# Patient Record
Sex: FEMALE | Race: WHITE | Employment: UNEMPLOYED | URBAN - METROPOLITAN AREA
[De-identification: names, ages, dates, MRNs, and addresses within clinical notes are randomized per-mention and may not be internally consistent; named-entity substitution may affect disease eponyms.]

---

## 2024-03-11 ENCOUNTER — OFFICE VISIT (OUTPATIENT)
Dept: OBGYN CLINIC | Age: 34
End: 2024-03-11
Payer: COMMERCIAL

## 2024-03-11 ENCOUNTER — PROCEDURE VISIT (OUTPATIENT)
Dept: OBGYN CLINIC | Age: 34
End: 2024-03-11
Payer: COMMERCIAL

## 2024-03-11 VITALS — DIASTOLIC BLOOD PRESSURE: 80 MMHG | SYSTOLIC BLOOD PRESSURE: 106 MMHG | WEIGHT: 133 LBS

## 2024-03-11 DIAGNOSIS — Z32.01 POSITIVE PREGNANCY TEST: ICD-10-CM

## 2024-03-11 DIAGNOSIS — Z34.90 NORMAL REPEAT PREGNANCY, ANTEPARTUM: ICD-10-CM

## 2024-03-11 DIAGNOSIS — N92.6 MISSED MENSES: Primary | ICD-10-CM

## 2024-03-11 PROCEDURE — 76801 OB US < 14 WKS SINGLE FETUS: CPT | Performed by: OBSTETRICS & GYNECOLOGY

## 2024-03-11 PROCEDURE — 81025 URINE PREGNANCY TEST: CPT | Performed by: ADVANCED PRACTICE MIDWIFE

## 2024-03-11 PROCEDURE — 99203 OFFICE O/P NEW LOW 30 MIN: CPT | Performed by: ADVANCED PRACTICE MIDWIFE

## 2024-03-11 RX ORDER — PNV NO.95/FERROUS FUM/FOLIC AC 28MG-0.8MG
1 TABLET ORAL DAILY
COMMUNITY

## 2024-03-11 NOTE — PROGRESS NOTES
Pt is here today for missed menses, positive pregnancy. She is breastfeeding currently  POCT -     
vs NIPs info provided for screening. Reviewed 1st trimester screening would be performed at [facility] around 10-12 weeks and referral will need placed prior to scheduling. Discussed with patient that if they proceed with the NIPs testing then they will be opting out of 1st trimester screening which can provide information in regards to placental health, congenital heart defects, metabolic abnormalities, and anatomic abnormalities. Given handout and instructed to fill out back side and sign with her decision to bring to OB History Visit.   Discussed Carrier Screening, given handout and instructed to fill out back side and sign with her decision by OB History Visit.   Prenatal labs to be ordered and drawn at OB History visit.   1 hour glucola needed: No  She was instructed to call with any concerns or worsening of any symptoms  Given New OB packet with information about nausea/vomiting in pregnancy, genetic information, carrier screening information.   She will return for New OB intake visit with Claudette MCDONOUGH.       Problem List updated after visit.    Return in about 2 weeks (around 3/25/2024) for OB Hx with Claudette.    The patient, Morelia Flannery, was seen with a total time spent of 35 minutes for the visit on this date of service [facility]  The time component involved both face-to-face (counseling and education) and non face-to-face time (care coordination), spent in determining the total time component.      Electronically signed by: FLACA Mann CNM

## 2024-03-11 NOTE — PROGRESS NOTES
LMP: 1/10/24    GA by LMP: 8w5d  JACKSON by LMP: 10/16/24    GA by U/S: 6w1d IUP  JACKSON by U/S: 11/3/24  HR: 109 bpm, most likely slow due to early GA    RT. OVARY: Not visualized on today's ultrasound  LT. OVARY: Not visualized on today's ultrasound     No free fluid    Patient returning in 2 weeks for repeat ultrasound and IPV

## 2024-03-25 ENCOUNTER — HOSPITAL ENCOUNTER (OUTPATIENT)
Age: 34
Discharge: HOME | End: 2024-03-25
Payer: COMMERCIAL

## 2024-03-25 LAB
25(OH)D3 SERPL-MCNC: 26.7 NG/ML (ref 30–100)
FOLATE SERPL-MCNC: >20 NG/ML (ref 4.8–24.2)
MAGNESIUM SERPL-MCNC: 2.2 MG/DL (ref 1.6–2.6)
T3FREE SERPL-MCNC: 2.9 PG/ML (ref 2–4.4)
T4 FREE SERPL-MCNC: 1.1 NG/DL (ref 0.92–1.68)
TSH SERPL DL<=0.05 MIU/L-ACNC: 0.34 UIU/ML (ref 0.27–4.2)
VIT B12 SERPL-MCNC: 881 PG/ML (ref 232–1245)

## 2024-03-25 PROCEDURE — 84443 ASSAY THYROID STIM HORMONE: CPT

## 2024-03-25 PROCEDURE — 84439 ASSAY OF FREE THYROXINE: CPT

## 2024-03-25 PROCEDURE — 82746 ASSAY OF FOLIC ACID SERUM: CPT

## 2024-03-25 PROCEDURE — 84481 FREE ASSAY (FT-3): CPT

## 2024-03-25 PROCEDURE — 82306 VITAMIN D 25 HYDROXY: CPT

## 2024-03-25 PROCEDURE — 36415 COLL VENOUS BLD VENIPUNCTURE: CPT

## 2024-03-25 PROCEDURE — 83735 ASSAY OF MAGNESIUM: CPT

## 2024-03-25 PROCEDURE — 82607 VITAMIN B-12: CPT

## 2024-03-25 NOTE — PROGRESS NOTES
[facility] only delivers at [facility] and is agreeable to delivery at [facility].  She denies tobacco use. The patient was counseled on the risks of tobacco abuse, both maternal and fetal. She was instructed to stop smoking if currently using tobacco. Morbidity, mortality, and cessation programs were reviewed. The risks include but are not limited to increased risks of  labor,  delivery, premature rupture of membranes, intrauterine growth restriction, intrauterine fetal demise and abruptio placenta. Secondary smoke risks were also reviewed. Increases in cancer, respiratory problems, and sudden infant death syndrome were reviewed as well.  She was made aware of the Midwife Philosophy against Elective Induction.     Morelia was seen today for initial prenatal visit.    Diagnoses and all orders for this visit:    Normal repeat pregnancy, antepartum  -     C.trachomatis N.gonorrhoeae DNA, Urine; Future  -     Culture, Urine; Future  -     Hemoglobinopathy Evaluation; Future  -     Hepatitis C Antibody; Future  -     HIV Screen; Future  -     Prenatal Profile I; Future  -     Prenatal type and screen; Future  -     Urine Drug Screen; Future  -     Varicella Zoster Antibody, IgG; Future  -     Vitamin D 25 Hydroxy; Future  -     Ambulatory referral to Maternal Fetal         Completed chart forwarded to Radha ELLER after completing visit.     Charted by Claudette Owens LPN

## 2024-03-27 ENCOUNTER — INITIAL PRENATAL (OUTPATIENT)
Dept: OBGYN CLINIC | Age: 34
End: 2024-03-27
Payer: COMMERCIAL

## 2024-03-27 ENCOUNTER — PROCEDURE VISIT (OUTPATIENT)
Dept: OBGYN CLINIC | Age: 34
End: 2024-03-27
Payer: COMMERCIAL

## 2024-03-27 ENCOUNTER — HOSPITAL ENCOUNTER (OUTPATIENT)
Age: 34
Setting detail: SPECIMEN
Discharge: HOME | End: 2024-03-27

## 2024-03-27 VITALS
WEIGHT: 134.1 LBS | BODY MASS INDEX: 22.9 KG/M2 | SYSTOLIC BLOOD PRESSURE: 116 MMHG | DIASTOLIC BLOOD PRESSURE: 70 MMHG | HEIGHT: 64 IN | HEART RATE: 93 BPM

## 2024-03-27 DIAGNOSIS — Z34.90 NORMAL REPEAT PREGNANCY, ANTEPARTUM: Primary | ICD-10-CM

## 2024-03-27 DIAGNOSIS — Z34.90 NORMAL REPEAT PREGNANCY, ANTEPARTUM: ICD-10-CM

## 2024-03-27 DIAGNOSIS — Z36.2 ENCOUNTER FOR REPEAT ULTRASOUND FOR FETAL HEART RATE: ICD-10-CM

## 2024-03-27 PROCEDURE — 0500F INITIAL PRENATAL CARE VISIT: CPT

## 2024-03-27 PROCEDURE — 36415 COLL VENOUS BLD VENIPUNCTURE: CPT | Performed by: ADVANCED PRACTICE MIDWIFE

## 2024-03-27 PROCEDURE — 76801 OB US < 14 WKS SINGLE FETUS: CPT | Performed by: OBSTETRICS & GYNECOLOGY

## 2024-03-27 SDOH — ECONOMIC STABILITY: FOOD INSECURITY: WITHIN THE PAST 12 MONTHS, YOU WORRIED THAT YOUR FOOD WOULD RUN OUT BEFORE YOU GOT MONEY TO BUY MORE.: NEVER TRUE

## 2024-03-27 SDOH — ECONOMIC STABILITY: HOUSING INSECURITY
IN THE LAST 12 MONTHS, WAS THERE A TIME WHEN YOU DID NOT HAVE A STEADY PLACE TO SLEEP OR SLEPT IN A SHELTER (INCLUDING NOW)?: NO

## 2024-03-27 SDOH — ECONOMIC STABILITY: INCOME INSECURITY: IN THE LAST 12 MONTHS, WAS THERE A TIME WHEN YOU WERE NOT ABLE TO PAY THE MORTGAGE OR RENT ON TIME?: NO

## 2024-03-27 SDOH — ECONOMIC STABILITY: TRANSPORTATION INSECURITY
IN THE PAST 12 MONTHS, HAS LACK OF TRANSPORTATION KEPT YOU FROM MEETINGS, WORK, OR FROM GETTING THINGS NEEDED FOR DAILY LIVING?: NO

## 2024-03-27 SDOH — ECONOMIC STABILITY: HOUSING INSECURITY: IN THE LAST 12 MONTHS, HOW MANY PLACES HAVE YOU LIVED?: 2

## 2024-03-27 SDOH — ECONOMIC STABILITY: FOOD INSECURITY: WITHIN THE PAST 12 MONTHS, THE FOOD YOU BOUGHT JUST DIDN'T LAST AND YOU DIDN'T HAVE MONEY TO GET MORE.: NEVER TRUE

## 2024-03-27 SDOH — ECONOMIC STABILITY: TRANSPORTATION INSECURITY
IN THE PAST 12 MONTHS, HAS THE LACK OF TRANSPORTATION KEPT YOU FROM MEDICAL APPOINTMENTS OR FROM GETTING MEDICATIONS?: NO

## 2024-03-27 ASSESSMENT — ANXIETY QUESTIONNAIRES
7. FEELING AFRAID AS IF SOMETHING AWFUL MIGHT HAPPEN: SEVERAL DAYS
1. FEELING NERVOUS, ANXIOUS, OR ON EDGE: SEVERAL DAYS
3. WORRYING TOO MUCH ABOUT DIFFERENT THINGS: SEVERAL DAYS
6. BECOMING EASILY ANNOYED OR IRRITABLE: SEVERAL DAYS
5. BEING SO RESTLESS THAT IT IS HARD TO SIT STILL: NOT AT ALL
GAD7 TOTAL SCORE: 4
2. NOT BEING ABLE TO STOP OR CONTROL WORRYING: NOT AT ALL
IF YOU CHECKED OFF ANY PROBLEMS ON THIS QUESTIONNAIRE, HOW DIFFICULT HAVE THESE PROBLEMS MADE IT FOR YOU TO DO YOUR WORK, TAKE CARE OF THINGS AT HOME, OR GET ALONG WITH OTHER PEOPLE: NOT DIFFICULT AT ALL
4. TROUBLE RELAXING: NOT AT ALL

## 2024-03-27 ASSESSMENT — SOCIAL DETERMINANTS OF HEALTH (SDOH)
HOW HARD IS IT FOR YOU TO PAY FOR THE VERY BASICS LIKE FOOD, HOUSING, MEDICAL CARE, AND HEATING?: NOT HARD AT ALL
WITHIN THE LAST YEAR, HAVE TO BEEN RAPED OR FORCED TO HAVE ANY KIND OF SEXUAL ACTIVITY BY YOUR PARTNER OR EX-PARTNER?: NO
WITHIN THE LAST YEAR, HAVE YOU BEEN HUMILIATED OR EMOTIONALLY ABUSED IN OTHER WAYS BY YOUR PARTNER OR EX-PARTNER?: NO
WITHIN THE LAST YEAR, HAVE YOU BEEN AFRAID OF YOUR PARTNER OR EX-PARTNER?: NO
WITHIN THE LAST YEAR, HAVE YOU BEEN KICKED, HIT, SLAPPED, OR OTHERWISE PHYSICALLY HURT BY YOUR PARTNER OR EX-PARTNER?: NO

## 2024-03-27 NOTE — PROGRESS NOTES
LMP: 1/10/24     GA by LMP: 11w  JACKSON by LMP: 10/16/24     GA by U/S: 8w5d IUP  JACKSON by U/S: 11/1/24  HR: 167 bpm    RT. OVARY: 2.21 x 1.53 x 1.83 cm  Unremarkable  LT. OVARY: Not visualized    No free fluid

## 2024-03-28 DIAGNOSIS — Z34.90 NORMAL REPEAT PREGNANCY, ANTEPARTUM: Primary | ICD-10-CM

## 2024-03-28 PROBLEM — R79.89 LOW VITAMIN D LEVEL: Status: ACTIVE | Noted: 2024-03-28

## 2024-03-28 LAB
25(OH)D3 SERPL-MCNC: 27 NG/ML (ref 30–100)
ABO + RH BLD: NORMAL
AMPHET UR QL SCN: NEGATIVE
BARBITURATES UR QL SCN: NEGATIVE
BASOPHILS # BLD: 0.04 K/UL (ref 0–0.2)
BASOPHILS NFR BLD: 0 % (ref 0–2)
BENZODIAZ UR QL: NEGATIVE
BLOOD GROUP ANTIBODIES SERPL: NEGATIVE
CHLAMYDIA DNA UR QL NAA+PROBE: NEGATIVE
COCAINE UR QL SCN: NEGATIVE
EOSINOPHILS RELATIVE PERCENT: 1 % (ref 1–4)
ERYTHROCYTE [DISTWIDTH] IN BLOOD BY AUTOMATED COUNT: 12.8 % (ref 11.8–14.4)
FENTANYL UR QL: NEGATIVE
HBV SURFACE AG SERPL QL IA: NONREACTIVE
HCT VFR BLD AUTO: 40.3 % (ref 36.3–47.1)
HGB BLD-MCNC: 13.8 G/DL (ref 11.9–15.1)
HIV 1+2 AB+HIV1 P24 AG SERPL QL IA: NONREACTIVE
IMM GRANULOCYTES # BLD AUTO: 0.04 K/UL (ref 0–0.3)
IMM GRANULOCYTES NFR BLD: 0 %
LYMPHOCYTES RELATIVE PERCENT: 19 % (ref 24–43)
MCH RBC QN AUTO: 31 PG (ref 25.2–33.5)
MCHC RBC AUTO-ENTMCNC: 34.2 G/DL (ref 28.4–34.8)
MCV RBC AUTO: 90.6 FL (ref 82.6–102.9)
MICROORGANISM SPEC CULT: NORMAL
MONOCYTES NFR BLD: 0.67 K/UL (ref 0.1–1.2)
N GONORRHOEA DNA UR QL NAA+PROBE: NEGATIVE
NEUTROPHILS NFR BLD: 74 % (ref 36–65)
NEUTS SEG NFR BLD: 8.59 K/UL (ref 1.5–8.1)
NRBC BLD-RTO: 0 PER 100 WBC
OPIATES UR QL SCN: POSITIVE
OXYCODONE UR QL SCN: NEGATIVE
PCP UR QL SCN: NEGATIVE
PLATELET # BLD AUTO: 236 K/UL (ref 138–453)
PMV BLD AUTO: 10 FL (ref 8.1–13.5)
RBC # BLD AUTO: 4.45 M/UL (ref 3.95–5.11)
RUBV IGG SERPL QL IA: 212 IU/ML
T PALLIDUM AB SER QL IA: NONREACTIVE
TEST INFORMATION: ABNORMAL
WBC OTHER # BLD: 11.6 K/UL (ref 3.5–11.3)

## 2024-03-29 PROBLEM — Z82.49 FAMILY HISTORY OF HIGH BLOOD PRESSURE: Status: ACTIVE | Noted: 2024-03-29

## 2024-03-29 PROBLEM — F41.9 ANXIETY: Status: ACTIVE | Noted: 2024-03-29

## 2024-03-29 LAB
HGB ELECTROPHORESIS INTERP: NORMAL
VZV IGG SER QL IA: 1.6

## 2024-04-02 DIAGNOSIS — Z34.90 NORMAL REPEAT PREGNANCY, ANTEPARTUM: ICD-10-CM

## 2024-04-05 LAB
CODEINE, URINE: 29 NG/ML
HYDROCODONE, URINE CONFIRMATION: <20 NG/ML
HYDROMORPHONE, URINE CONFIRMATION: <20 NG/ML
MORPHINE, URINE CONFIRMATION: <20 NG/ML
NORHYDROCODONE, URINE: <20 NG/ML
NOROXYCODONE, URINE: <20 NG/ML
NOROXYMORPHONE, URINE: <20 NG/ML
OPIATE, 6-AM URINE: <10 NG/ML
OXYCODONE, URINE CONFIRMATION: <20 NG/ML
OXYMORPHONE, URINE CONFIRMATION: <20 NG/ML

## 2024-04-08 PROBLEM — R82.5 POSITIVE URINE DRUG SCREEN: Status: ACTIVE | Noted: 2024-04-08

## 2024-04-22 ENCOUNTER — ROUTINE PRENATAL (OUTPATIENT)
Dept: OBGYN CLINIC | Age: 34
End: 2024-04-22

## 2024-04-22 ENCOUNTER — HOSPITAL ENCOUNTER (OUTPATIENT)
Age: 34
Setting detail: SPECIMEN
Discharge: HOME | End: 2024-04-22

## 2024-04-22 VITALS
WEIGHT: 136.9 LBS | BODY MASS INDEX: 23.5 KG/M2 | DIASTOLIC BLOOD PRESSURE: 68 MMHG | HEART RATE: 91 BPM | SYSTOLIC BLOOD PRESSURE: 112 MMHG

## 2024-04-22 DIAGNOSIS — Z34.90 NORMAL REPEAT PREGNANCY, ANTEPARTUM: ICD-10-CM

## 2024-04-22 DIAGNOSIS — Z34.90 NORMAL REPEAT PREGNANCY, ANTEPARTUM: Primary | ICD-10-CM

## 2024-04-22 DIAGNOSIS — F41.9 ANXIETY: ICD-10-CM

## 2024-04-22 DIAGNOSIS — R82.5 POSITIVE URINE DRUG SCREEN: ICD-10-CM

## 2024-04-22 DIAGNOSIS — Z86.39 HISTORY OF HYPOGLYCEMIA: ICD-10-CM

## 2024-04-22 DIAGNOSIS — Z82.49 FAMILY HISTORY OF HIGH BLOOD PRESSURE: ICD-10-CM

## 2024-04-22 DIAGNOSIS — Z3A.12 12 WEEKS GESTATION OF PREGNANCY: ICD-10-CM

## 2024-04-22 DIAGNOSIS — R79.89 LOW VITAMIN D LEVEL: ICD-10-CM

## 2024-04-22 PROCEDURE — 0502F SUBSEQUENT PRENATAL CARE: CPT | Performed by: ADVANCED PRACTICE MIDWIFE

## 2024-04-22 RX ORDER — BLOOD-GLUCOSE METER
1 KIT MISCELLANEOUS 4 TIMES DAILY
Qty: 1 KIT | Refills: 0 | Status: SHIPPED | OUTPATIENT
Start: 2024-04-22

## 2024-04-22 RX ORDER — SERTRALINE HYDROCHLORIDE 100 MG/1
100 TABLET, FILM COATED ORAL DAILY
COMMUNITY
Start: 2024-04-09

## 2024-04-22 RX ORDER — LANCETS 30 GAUGE
1 EACH MISCELLANEOUS 4 TIMES DAILY
Qty: 100 EACH | Refills: 3 | Status: SHIPPED | OUTPATIENT
Start: 2024-04-22

## 2024-04-22 RX ORDER — GLUCOSAMINE HCL/CHONDROITIN SU 500-400 MG
1 CAPSULE ORAL 4 TIMES DAILY
Qty: 100 STRIP | Refills: 3 | Status: SHIPPED | OUTPATIENT
Start: 2024-04-22

## 2024-04-22 RX ORDER — PNV NO.95/FERROUS FUM/FOLIC AC 28MG-0.8MG
1 TABLET ORAL DAILY
Qty: 30 TABLET | Refills: 11 | Status: SHIPPED | OUTPATIENT
Start: 2024-04-22

## 2024-04-22 RX ORDER — BLOOD PRESSURE TEST KIT
1 KIT MISCELLANEOUS 4 TIMES DAILY
Qty: 100 EACH | Refills: 3 | Status: SHIPPED | OUTPATIENT
Start: 2024-04-22

## 2024-04-22 ASSESSMENT — PATIENT HEALTH QUESTIONNAIRE - PHQ9
SUM OF ALL RESPONSES TO PHQ QUESTIONS 1-9: 0
2. FEELING DOWN, DEPRESSED OR HOPELESS: NOT AT ALL
SUM OF ALL RESPONSES TO PHQ9 QUESTIONS 1 & 2: 0
SUM OF ALL RESPONSES TO PHQ QUESTIONS 1-9: 0
1. LITTLE INTEREST OR PLEASURE IN DOING THINGS: NOT AT ALL

## 2024-04-22 NOTE — PROGRESS NOTES
Pt is here today at her 12.1 pnv   Pt states fetal movement is n/a  Pt has no concerns     
exam/evaluation  [x] Ordering medications, tests, procedures  [x] Placing referrals or communicating with other HCP  [x] Documenting and updating Problem List as necessary  [x] Importance of compliance with treatment plan discussed  [x] Counseling patient/support person  [] Coordinating care    Electronically signed by: FLACA Mann CNM

## 2024-04-23 LAB
AMPHET UR QL SCN: NEGATIVE
BARBITURATES UR QL SCN: NEGATIVE
BENZODIAZ UR QL: NEGATIVE
CANNABINOIDS UR QL SCN: NEGATIVE
COCAINE UR QL SCN: NEGATIVE
FENTANYL UR QL: NEGATIVE
METHADONE UR QL: NEGATIVE
OPIATES UR QL SCN: NEGATIVE
OXYCODONE UR QL SCN: NEGATIVE
PCP UR QL SCN: NEGATIVE
TEST INFORMATION: NORMAL

## 2024-05-21 ENCOUNTER — ROUTINE PRENATAL (OUTPATIENT)
Dept: OBGYN CLINIC | Age: 34
End: 2024-05-21

## 2024-05-21 VITALS
HEART RATE: 92 BPM | DIASTOLIC BLOOD PRESSURE: 70 MMHG | WEIGHT: 137.2 LBS | SYSTOLIC BLOOD PRESSURE: 112 MMHG | BODY MASS INDEX: 23.55 KG/M2

## 2024-05-21 DIAGNOSIS — Z34.90 NORMAL REPEAT PREGNANCY, ANTEPARTUM: Primary | ICD-10-CM

## 2024-05-21 DIAGNOSIS — R82.5 POSITIVE URINE DRUG SCREEN: ICD-10-CM

## 2024-05-21 PROCEDURE — 0502F SUBSEQUENT PRENATAL CARE: CPT

## 2024-05-21 NOTE — PROGRESS NOTES
SUBJECTIVE:  Morelia is here for her return OB visit.  She reports feeling fetal movement.  She denies vaginal bleeding.  She denies vaginal discharge.  She denies leaking of fluid.  She denies uterine cramping.  She denies nausea and/or vomiting.  Morelia reports she is doing well, no concerns or complaints today    OBJECTIVE:  Blood pressure 112/70, pulse 92, weight 62.2 kg (137 lb 3.2 oz), last menstrual period 01/10/2024, currently breastfeeding.    Pregravid BMI: 24.02   TWG: -1.27 kg (-2 lb 12.8 oz)      O positive    ASSESSMENT/PLAN:  IUP at 16w2d  Morelia will watch for fetal movement.  NT screening was previously declined  NIPT was previously declined  Carrier screening was previously declined  [x] EDC was established.  [x] Labs were reviewed.  [] Single sMAFP was not ordered for the 16-20 wk gestational window.  [] Weight gain guidelines were not reviewed.   Normal: BMI < 25: Gain 25-35 lb  Overweight: BMI 25-30: Gain 15-25 lb  Obese: BMI > 30: Gain 11-20 lb  [x] Reviewed warning signs: cramping, acute abdominal pain, dysuria, fever/chills, abnormal vaginal discharge or leaking fluid, or vaginal bleeding.  [x] MFM referral in place for anatomy scan at 18-22 weeks.   [x] I have reviewed RN Initial OB Visit.   S =D  Morelia was seen today for routine prenatal visit.    Diagnoses and all orders for this visit:    Normal repeat pregnancy, antepartum  Anatomy scan 7/8/24    Positive urine drug screen (FALSE POSITIVE)  Repeat monthly    Return in about 4 weeks (around 6/18/2024) for OB visit with midwives.    The patient, Morelia Flannery, was seen with a total time spent of 30 minutes for the visit on this date [facility]  The time component involved both face-to-face (counseling and education) and non face-to-face time (care coordination), spent in determining the total time component.      On this date May 21, 2024,  I have spent greater than 50% of this visit:  [x] Reviewing previous

## 2024-06-28 ENCOUNTER — ROUTINE PRENATAL (OUTPATIENT)
Dept: OBGYN CLINIC | Age: 34
End: 2024-06-28

## 2024-06-28 VITALS
DIASTOLIC BLOOD PRESSURE: 70 MMHG | BODY MASS INDEX: 25.4 KG/M2 | SYSTOLIC BLOOD PRESSURE: 108 MMHG | HEART RATE: 66 BPM | WEIGHT: 148 LBS

## 2024-06-28 DIAGNOSIS — Z34.90 NORMAL REPEAT PREGNANCY, ANTEPARTUM: ICD-10-CM

## 2024-06-28 DIAGNOSIS — Z3A.21 21 WEEKS GESTATION OF PREGNANCY: Primary | ICD-10-CM

## 2024-06-28 DIAGNOSIS — R79.89 LOW VITAMIN D LEVEL: ICD-10-CM

## 2024-06-28 DIAGNOSIS — F41.9 ANXIETY: ICD-10-CM

## 2024-06-28 PROCEDURE — 0502F SUBSEQUENT PRENATAL CARE: CPT

## 2024-06-28 NOTE — PROGRESS NOTES
Pt is here today at her 21.5  Pt states fetal movement is present  Pt has no concerns   EPDS - 5  
pregnancy, antepartum  Anatomy scan is scheduled for 7/8/24  Next appointment to be in 6 weeks    Anxiety  EPDS today is 5  Patient states she is stable on Zoloft    Low vitamin D level  Discussed redrawing at 28 weeks          Return in about 6 weeks (around 8/9/2024) for return ob with glucose testing.     The patient, Morelia Flannery, was seen with a total time spent of 25 minutes for the visit on this date of service [facility] the [facility]  The time component involved both face-to-face (counseling and education) and non face-to-face time (care coordination), spent in determining the total time component.      On this date June 28, 2024, I have spent greater than 50% of this visit:  [x] Reviewing previous notes/pre-charting  [x] Reviewing test results  [x] Performing necessary physical exam/evaluation  [] Ordering medications, tests, procedures  [] Placing referrals or communicating with other HCP  [x] Documenting and updating Problem List as necessary  [x] Importance of compliance with treatment plan discussed  [x] Counseling patient/support person  [] Coordinating care    Electronically signed by: FLACA Bertrand CNM

## 2024-07-08 ENCOUNTER — ROUTINE PRENATAL (OUTPATIENT)
Dept: PERINATAL CARE | Age: 34
End: 2024-07-08
Payer: COMMERCIAL

## 2024-07-08 VITALS
RESPIRATION RATE: 16 BRPM | TEMPERATURE: 97.6 F | HEART RATE: 98 BPM | SYSTOLIC BLOOD PRESSURE: 120 MMHG | BODY MASS INDEX: 25.86 KG/M2 | DIASTOLIC BLOOD PRESSURE: 77 MMHG | WEIGHT: 151.46 LBS | HEIGHT: 64 IN

## 2024-07-08 DIAGNOSIS — O09.891 MEDICATION EXPOSURE DURING FIRST TRIMESTER OF PREGNANCY: Primary | ICD-10-CM

## 2024-07-08 DIAGNOSIS — O43.102 PLACENTAL ABNORMALITY IN SECOND TRIMESTER: ICD-10-CM

## 2024-07-08 DIAGNOSIS — O35.8XX0 SUSPECTED DAMAGE TO FETUS FROM DISEASE IN MOTHER, ANTEPARTUM CONDITION, SINGLE OR UNSPECIFIED FETUS: ICD-10-CM

## 2024-07-08 DIAGNOSIS — Z36.86 ENCOUNTER FOR SCREENING FOR RISK OF PRE-TERM LABOR: ICD-10-CM

## 2024-07-08 DIAGNOSIS — Z3A.23 23 WEEKS GESTATION OF PREGNANCY: ICD-10-CM

## 2024-07-08 PROCEDURE — 99999 PR OFFICE/OUTPT VISIT,PROCEDURE ONLY: CPT | Performed by: OBSTETRICS & GYNECOLOGY

## 2024-07-08 PROCEDURE — 93325 DOPPLER ECHO COLOR FLOW MAPG: CPT | Performed by: OBSTETRICS & GYNECOLOGY

## 2024-07-08 PROCEDURE — 76825 ECHO EXAM OF FETAL HEART: CPT | Performed by: OBSTETRICS & GYNECOLOGY

## 2024-07-08 PROCEDURE — 76827 ECHO EXAM OF FETAL HEART: CPT | Performed by: OBSTETRICS & GYNECOLOGY

## 2024-07-08 PROCEDURE — 76817 TRANSVAGINAL US OBSTETRIC: CPT | Performed by: OBSTETRICS & GYNECOLOGY

## 2024-07-08 PROCEDURE — 76811 OB US DETAILED SNGL FETUS: CPT | Performed by: OBSTETRICS & GYNECOLOGY

## 2024-07-08 NOTE — PROGRESS NOTES
Patient declined non-invasive prenatal testing/NIPT (cell-free DNA screening) that is offered to all pregnant patients irrespective of baseline risk or maternal age (Obstet Gynecol 2020; 136; ACOG Practice Bulletin Number 226, Screening for Fetal Chromosomal Abnormalities).   Patient has declined non-invasive prenatal diagnosis testing (with the [place] Complete test from [employer]) today.     Patient has declined the Five Gene Carrier Screen (with the [place] test from [employer]) today.     MSAFP (maternal serum alpha-feto protein level) lab draw declined by patient.     Patient declines a Maternal-Fetal Medicine complete physician consultation today regarding the fetal and/or maternal medical/obstetrical complications/co-morbidities of pregnancy.      Maternal-Fetal Medicine (MFM) attending physician will defer all management for these medical/obstetrical complications of pregnancy to the primary attending obstetrical physician/provider, as a result.  Therefore, only an ultrasound evaluation was completed today in the MFM office.      Please refer to Maternal-Fetal Medicine OBGYN resident progress note in EPIC.

## 2024-07-08 NOTE — PROGRESS NOTES
Obstetric/Gynecology Maternal Fetal Medicine Resident Note    Patient declines formal consult with MFM attending physician for fetal exposure to Zoloft.     Patient declined NIPT, maternal carrier screen, and AFP testing.    Geno Ivy MD  OBGYN Resident, PGY2  [facility]  7/8/2024, 10:04 AM

## 2024-08-06 NOTE — PROGRESS NOTES
Pt is here today at her 27.5 wk appt   Pt states fetal movement is good  Pt has no concerns              [facility] OBSTETRICS AND GYNECOLOGY  [address]   [address]  [place] [id number]  Dept: [phone] Name: Morelia Flannery  Patient : 1990  MRN #: [id number]  CSN #: [id number]        Visit date:  2024     Morelia Flannery is a 33 y.o. female 27w2d here for 1hr GTT                                          Glucose 50g drink given @ 9:35am              Glucose drink finished @9:37 am                 Lot#[id number]              Exp- 26     1hr Draw @  10:37 am    Pt tolerated procedure well     Jhoana Darnell MA

## 2024-08-07 NOTE — PROGRESS NOTES
SUBJECTIVE:  Morelia is here for her return OB visit.  She is doing well but is reporting concern with anxiety.  Talking with her therapist and considering increasing her dosage. She will let us know  She reports fetal movement.  She denies vaginal bleeding.  She denies vaginal discharge.  She denies leaking of fluid.  She denies uterine contraction activity.  She denies nausea and/or vomiting.  She denies retaining fluid in her extremities.    OBJECTIVE:  Blood pressure 114/64, pulse 96, weight 71.4 kg (157 lb 4.8 oz), last menstrual period 01/10/2024, currently breastfeeding.      Morelia has not received the Tdap vaccine as recommended          6/28/2024    10:35 AM   Postpartum Depression Scale   I have been able to laugh and see the funny side of things As much as I always could   I have looked forward with enjoyment to things As much as I ever did   I have blamed myself unnecessarily when things went wrong Yes, some of the time   I have been anxious or worried for no good reason Yes, sometimes   I have felt scared or panicky for no good reason No, not at all   I haven't been able to cope lately No, most of the time I have coped quite well   I have been so unhappy that I have had difficulty sleeping Not at all   I have felt sad or miserable No, not at all   I have been so unhappy that I have been crying No, never   The thought of harming myself has occurred to me Never   Total 5            4/22/2024     2:16 PM   PHQ Scores   PHQ2 Score 0   PHQ9 Score 0           3/27/2024     3:00 PM   GERI 7 SCORE   GERI-7 Total Score 4          ASSESSMENT/PLAN:  1. 27 weeks gestation of pregnancy  S=D    2. Normal repeat pregnancy, antepartum  The problem list was reviewed and updated with any new issues from today's visit    Morelia will monitor fetal movement daily.    [x] 28 week lab panel was drawn today  [x] .Preliminary discussion on birth plan started    - CBC; Future  - Glucose Tolerance, 1 Hr; Future  - Vitamin D 25

## 2024-08-09 ENCOUNTER — ROUTINE PRENATAL (OUTPATIENT)
Dept: OBGYN CLINIC | Age: 34
End: 2024-08-09

## 2024-08-09 ENCOUNTER — HOSPITAL ENCOUNTER (OUTPATIENT)
Age: 34
Setting detail: SPECIMEN
Discharge: HOME | End: 2024-08-09

## 2024-08-09 VITALS
SYSTOLIC BLOOD PRESSURE: 114 MMHG | WEIGHT: 157.3 LBS | BODY MASS INDEX: 27 KG/M2 | DIASTOLIC BLOOD PRESSURE: 64 MMHG | HEART RATE: 96 BPM

## 2024-08-09 DIAGNOSIS — R79.89 LOW VITAMIN D LEVEL: ICD-10-CM

## 2024-08-09 DIAGNOSIS — Z3A.27 27 WEEKS GESTATION OF PREGNANCY: ICD-10-CM

## 2024-08-09 DIAGNOSIS — F41.9 ANXIETY: ICD-10-CM

## 2024-08-09 DIAGNOSIS — Z34.90 NORMAL REPEAT PREGNANCY, ANTEPARTUM: ICD-10-CM

## 2024-08-09 DIAGNOSIS — Z34.90 NORMAL REPEAT PREGNANCY, ANTEPARTUM: Primary | ICD-10-CM

## 2024-08-09 LAB
25(OH)D3 SERPL-MCNC: 27.3 NG/ML (ref 30–100)
ERYTHROCYTE [DISTWIDTH] IN BLOOD BY AUTOMATED COUNT: 13.3 % (ref 11.8–14.4)
GLUCOSE 1H P 50 G GLC PO SERPL-MCNC: 99 MG/DL (ref 70–135)
GLUCOSE ADMINISTRATION: NORMAL
HCT VFR BLD AUTO: 36.5 % (ref 36.3–47.1)
HGB BLD-MCNC: 11.9 G/DL (ref 11.9–15.1)
MCH RBC QN AUTO: 31.9 PG (ref 25.2–33.5)
MCHC RBC AUTO-ENTMCNC: 32.6 G/DL (ref 28.4–34.8)
MCV RBC AUTO: 97.9 FL (ref 82.6–102.9)
NRBC BLD-RTO: 0 PER 100 WBC
PLATELET # BLD AUTO: 175 K/UL (ref 138–453)
PMV BLD AUTO: 10.5 FL (ref 8.1–13.5)
RBC # BLD AUTO: 3.73 M/UL (ref 3.95–5.11)
WBC OTHER # BLD: 8.3 K/UL (ref 3.5–11.3)

## 2024-08-21 NOTE — PROGRESS NOTES
Pt is here today at her 29.4 pnv   Pt states fetal movement is good  Pt has no concerns     1 hour done   Tdap ? - Patient stated she would like to wait and discuss at NOV. Patient would like a information sheet on Tdap.    Information sheet given to patient.

## 2024-08-22 ENCOUNTER — HOSPITAL ENCOUNTER (OUTPATIENT)
Age: 34
Setting detail: SPECIMEN
Discharge: HOME | End: 2024-08-22

## 2024-08-22 ENCOUNTER — ROUTINE PRENATAL (OUTPATIENT)
Dept: OBGYN CLINIC | Age: 34
End: 2024-08-22

## 2024-08-22 VITALS
HEART RATE: 90 BPM | DIASTOLIC BLOOD PRESSURE: 74 MMHG | WEIGHT: 158.9 LBS | BODY MASS INDEX: 27.28 KG/M2 | SYSTOLIC BLOOD PRESSURE: 116 MMHG

## 2024-08-22 DIAGNOSIS — R82.5 POSITIVE URINE DRUG SCREEN: ICD-10-CM

## 2024-08-22 DIAGNOSIS — Z34.90 NORMAL REPEAT PREGNANCY, ANTEPARTUM: ICD-10-CM

## 2024-08-22 DIAGNOSIS — Z34.90 NORMAL REPEAT PREGNANCY, ANTEPARTUM: Primary | ICD-10-CM

## 2024-08-22 DIAGNOSIS — F41.9 ANXIETY: ICD-10-CM

## 2024-08-22 DIAGNOSIS — Z3A.29 29 WEEKS GESTATION OF PREGNANCY: ICD-10-CM

## 2024-08-22 DIAGNOSIS — Z23 NEED FOR DIPHTHERIA-TETANUS-PERTUSSIS (TDAP) VACCINE: ICD-10-CM

## 2024-08-22 NOTE — PROGRESS NOTES
SUBJECTIVE:  Morelia is here for her return OB visit.  She reports fetal movement.  She denies vaginal bleeding.  She denies vaginal discharge.  She denies leaking of fluid.  She denies uterine contraction activity.  She denies nausea and/or vomiting.  She denies retaining fluid in her extremities.  She denies headache, vision changes, RUQ pain, and epigastric pain.  Morelia reports she is doing okay. Dr. Tabares increased Zoloft to 150 mg.   Interested in nitrous oxide for pain relief in labor.       OBJECTIVE:  Blood pressure 116/74, pulse 90, weight 72.1 kg (158 lb 14.4 oz), last menstrual period 01/10/2024, currently breastfeeding.     Pregravid BMI: 24.02   TW.573 kg (18 lb 14.4 oz)    Morelia has not received the Tdap vaccine as appropriate  Rhogam was not indicated    ASSESSMENT/PLAN:  IUP @ 29w4d  Morelia will monitor fetal movement daily.   [x] 28 week lab results were reviewed. Glucola 99, Hgb 11.9.   [x] Fetal Kick Counts reinforced.   [x] Blair-Joseph contractions vs  labor contractions were reviewed.  [] Signs and symptoms of Pre-Eclampsia were not reviewed and discussed.  [x] Initial discussion regarding birth plans was begun. Given 36w packet and signed nitrous/hydrotherapy.   [x] Given 36 week birth packet for review.    S = D    Normal repeat pregnancy, antepartum  Urine Drug Screen; Future    Positive urine drug screen (FALSE POSITIVE)  See prior notes, no clinical suspicion for opiate use and repeat tesing was negative.   Rpt sent today with consent  Previously recommended rpt every 4 weeks    Anxiety (on SSRI)  Continue Zoloft per psychiatrist order  Feels coping well, continue to monitor    Need Tdap  Undecided, given handout      Return in about 2 weeks (around 2024) for OB visit with Midwives.     The patient, Morelia Flannery, was seen with a total time spent of 35 minutes for the visit on this date of service by the [facility]  The time component involved both face-to-face (counseling and

## 2024-09-04 NOTE — PROGRESS NOTES
Pt is here today at her 31.4 wk appt   Pt states fetal movement is good   Pt has no concerns      Tdap  Urine

## 2024-09-05 ENCOUNTER — ROUTINE PRENATAL (OUTPATIENT)
Dept: OBGYN CLINIC | Age: 34
End: 2024-09-05

## 2024-09-05 VITALS
HEART RATE: 91 BPM | DIASTOLIC BLOOD PRESSURE: 68 MMHG | SYSTOLIC BLOOD PRESSURE: 110 MMHG | BODY MASS INDEX: 27.98 KG/M2 | WEIGHT: 163 LBS

## 2024-09-05 DIAGNOSIS — Z34.90 NORMAL REPEAT PREGNANCY, ANTEPARTUM: ICD-10-CM

## 2024-09-05 DIAGNOSIS — Z3A.31 31 WEEKS GESTATION OF PREGNANCY: Primary | ICD-10-CM

## 2024-09-05 DIAGNOSIS — Z23 NEED FOR DIPHTHERIA-TETANUS-PERTUSSIS (TDAP) VACCINE: ICD-10-CM

## 2024-09-05 NOTE — PROGRESS NOTES
SUBJECTIVE:  Morelia is here for her return OB visit.  She reports fetal movement.  She denies vaginal bleeding.  She denies vaginal discharge.  She denies leaking of fluid.  She denies uterine contraction activity.  She denies nausea and/or vomiting.  She denies retaining fluid in her extremities.  She denies headache, vision changes, RUQ pain, and epigastric pain.    Morelia reports feeling well today. OK with tdap today in office. Is desiring to keep her next appointment spread out, would like to come back in 4 weeks versus 2.     Patient is deciding if she wants to cancel her next MFM scan.     Initiated birth plan discussion today, states she will talk to her  about the rest and let us know.     OBJECTIVE:  Blood pressure 110/68, pulse 91, weight 73.9 kg (163 lb), last menstrual period 01/10/2024, currently breastfeeding.     Pregravid BMI: 24.02   TWG: 10.4 kg (23 lb)    Morelia has received the Tdap vaccine as appropriate, today in office   Rhogam was not indicated    ASSESSMENT/PLAN:  IUP @ 31w4d  Morelia will monitor fetal movement daily.   [x] 28 week lab results were reviewed.   [x] Fetal Kick Count was discussed and explained. She was instructed to lay on her left side 20 minutes after eating and count movements for up to 2 hours with a target value of 10 movements. Instructed to notify office if she does not make the target.  [x] Wheaton-Joseph contractions vs  labor contractions were reviewed.  [x] Signs and symptoms of Pre-Eclampsia were reviewed and discussed.  [x] Initial discussion regarding birth plans was begun.  [] Given 36 week birth packet for review.      Morelia was seen today for routine prenatal visit.    Diagnoses and all orders for this visit:    31 weeks gestation of pregnancy  -     Tdap, BOOSTRIX, (age 10 yrs+), IM    Normal repeat pregnancy, antepartum  S = D    Need for diphtheria-tetanus-pertussis (Tdap) vaccine  Vaccine Information Statement TDAP edition 2/24/15 given to

## 2024-10-07 ENCOUNTER — TELEPHONE (OUTPATIENT)
Dept: OBGYN CLINIC | Age: 34
End: 2024-10-07

## 2024-10-07 NOTE — TELEPHONE ENCOUNTER
Spoke with Morelia, cramping/BH have been on and off but more uncomfortable than she recalls in prior pregnancies. Reviewed warning signs to report. Reviewed hydration, Tylenol, warm tub bath/shower and if she feels things are worsening she should call back. No urinary or vaginal symptoms.   She verbalizes understanding.

## 2024-10-07 NOTE — TELEPHONE ENCOUNTER
Pt stated having tightness on belly, increasing when walking, baby is moving a lot. Pt unsure about Clark dunlap having them the last 2 weeks. Tightness in the groin/pelvic area. Pt has an appt with Loren on 10/09. Please advise?

## 2024-10-08 NOTE — PROGRESS NOTES
[facility] OBSTETRICS AND GYNECOLOGY  [address]   [address]  [facility] [address]  Dept: [phone]      Patient Name: Morelia Flannery  Patient : 1990  MRN #: [id number]  [facility] #: [id number]    Date: 10/09/2024    Chief Complaint   Patient presents with    Routine Prenatal Visit     Patient's last menstrual period was 01/10/2024 (exact date).    SUBJECTIVE:    Morelia is here for her return OB visit.  She is 36w2d weeks pregnant.   She reports  feeling fetal movement.  She denies vaginal bleeding, vaginal discharge, leaking of fluid.  She denies uterine cramping. Freq BH ctx  She denies  nausea and/or vomiting.  She denies HA, visual changes, edema, or RUQ pain.     OB History    Para Term  AB Living   5 3 3   1 3   SAB IAB Ectopic Molar Multiple Live Births   1         3      # Outcome Date GA Lbr Matt/2nd Weight Sex Type Anes PTL Lv   5 Current            4 Term 22 40w0d  3.629 kg (8 lb) F Vag-Spont EPI N YOVANA   3 Term 19 39w4d  3.175 kg (7 lb) M Vag-Spont EPI N YOVANA   2 Term 16 41w0d  2.722 kg (6 lb) F Vag-Spont EPI N YOVANA   1 2015     Vag-Spont   YOVANA      Obstetric Comments   [id number]     Past Medical History:   Diagnosis Date    Anxiety     Depression      Past Surgical History:   Procedure Laterality Date    DILATION AND CURETTAGE       Current Outpatient Medications   Medication Sig Dispense Refill    sertraline (ZOLOFT) 100 MG tablet Take 1.5 tablets by mouth daily      Prenatal Vit-Fe Fumarate-FA (PRENATAL VITAMINS) 28-0.8 MG TABS Take 1 tablet by mouth daily (Patient not taking: Reported on 10/9/2024) 30 tablet 11    Probiotic Product (PROBIOTIC BLEND PO) Take by mouth (Patient not taking: Reported on 10/9/2024)       No current facility-administered medications for this visit.     No Known Allergies    OBJECTIVE:  /76   Pulse 96   Wt 74.8 kg (164 lb 12.8 oz)   LMP 01/10/2024

## 2024-10-09 ENCOUNTER — HOSPITAL ENCOUNTER (OUTPATIENT)
Age: 34
Setting detail: SPECIMEN
Discharge: HOME | End: 2024-10-09

## 2024-10-09 ENCOUNTER — ROUTINE PRENATAL (OUTPATIENT)
Dept: OBGYN CLINIC | Age: 34
End: 2024-10-09

## 2024-10-09 VITALS
WEIGHT: 164.8 LBS | DIASTOLIC BLOOD PRESSURE: 76 MMHG | BODY MASS INDEX: 28.29 KG/M2 | HEART RATE: 96 BPM | SYSTOLIC BLOOD PRESSURE: 118 MMHG

## 2024-10-09 DIAGNOSIS — R79.89 LOW VITAMIN D LEVEL: ICD-10-CM

## 2024-10-09 DIAGNOSIS — Z34.90 NORMAL REPEAT PREGNANCY, ANTEPARTUM: ICD-10-CM

## 2024-10-09 DIAGNOSIS — F41.9 ANXIETY: ICD-10-CM

## 2024-10-09 DIAGNOSIS — Z3A.36 36 WEEKS GESTATION OF PREGNANCY: Primary | ICD-10-CM

## 2024-10-09 DIAGNOSIS — Z3A.36 36 WEEKS GESTATION OF PREGNANCY: ICD-10-CM

## 2024-10-09 PROCEDURE — 0502F SUBSEQUENT PRENATAL CARE: CPT | Performed by: ADVANCED PRACTICE MIDWIFE

## 2024-10-12 LAB
MICROORGANISM SPEC CULT: NORMAL
SERVICE CMNT-IMP: NORMAL
SPECIMEN DESCRIPTION: NORMAL

## 2024-10-16 NOTE — PROGRESS NOTES
SUBJECTIVE:    Morelia is here for her routine OB visit.  She is doing well, waiting for labor.  Feeling better about waiting this time.  Hoping for nmedicated birth  Reporting BH on and off  She reports  fetal movement.  She denies  vaginal bleeding.  She denies  leaking of fluid.  She denies  vaginal discharge.  She denies  nausea and/or vomiting.  She denies retaining fluid in her extremities  She denies headache, visual changes, epigastric discomfort      OBJECTIVE:   Blood pressure 100/68, pulse (!) 101, weight 76.9 kg (169 lb 9.6 oz), last menstrual period 01/10/2024, currently breastfeeding.        6/28/2024    10:35 AM   Postpartum Depression Scale   I have been able to laugh and see the funny side of things As much as I always could   I have looked forward with enjoyment to things As much as I ever did   I have blamed myself unnecessarily when things went wrong Yes, some of the time   I have been anxious or worried for no good reason Yes, sometimes   I have felt scared or panicky for no good reason No, not at all   I haven't been able to cope lately No, most of the time I have coped quite well   I have been so unhappy that I have had difficulty sleeping Not at all   I have felt sad or miserable No, not at all   I have been so unhappy that I have been crying No, never   The thought of harming myself has occurred to me Never   Total 5            ASSESSMENT/PLAN:  1. 37 weeks gestation of pregnancy  S=D    2. Normal repeat pregnancy, antepartum  The problem list was reviewed and updated as needed.    Morelia will monitor for fetal movements daily    [x]  GBS culture is NEGATIVE  [x]  Has received Tdap  [x]  Signs of labor to report were discussed  [x]  When to call/page for labor was discussed   [x]  Birth preferences were discussed and updated  []  Post-dates testing and protocol were not discussed  [x]  Morelia was counseled regarding Post Partum Depression and help  []  She has not decided on contraceptive

## 2024-10-18 ENCOUNTER — ROUTINE PRENATAL (OUTPATIENT)
Dept: OBGYN CLINIC | Age: 34
End: 2024-10-18

## 2024-10-18 VITALS
WEIGHT: 169.6 LBS | SYSTOLIC BLOOD PRESSURE: 100 MMHG | DIASTOLIC BLOOD PRESSURE: 68 MMHG | HEART RATE: 101 BPM | BODY MASS INDEX: 29.11 KG/M2

## 2024-10-18 DIAGNOSIS — Z34.90 NORMAL REPEAT PREGNANCY, ANTEPARTUM: Primary | ICD-10-CM

## 2024-10-18 DIAGNOSIS — F41.9 ANXIETY: ICD-10-CM

## 2024-10-18 DIAGNOSIS — Z3A.37 37 WEEKS GESTATION OF PREGNANCY: ICD-10-CM

## 2024-10-24 ENCOUNTER — ROUTINE PRENATAL (OUTPATIENT)
Dept: OBGYN CLINIC | Age: 34
End: 2024-10-24

## 2024-10-24 VITALS
SYSTOLIC BLOOD PRESSURE: 118 MMHG | WEIGHT: 170.1 LBS | HEART RATE: 102 BPM | DIASTOLIC BLOOD PRESSURE: 78 MMHG | BODY MASS INDEX: 29.2 KG/M2

## 2024-10-24 DIAGNOSIS — F41.9 ANXIETY: ICD-10-CM

## 2024-10-24 DIAGNOSIS — Z3A.38 38 WEEKS GESTATION OF PREGNANCY: ICD-10-CM

## 2024-10-24 DIAGNOSIS — R82.5 POSITIVE URINE DRUG SCREEN: ICD-10-CM

## 2024-10-24 DIAGNOSIS — Z34.90 NORMAL REPEAT PREGNANCY, ANTEPARTUM: Primary | ICD-10-CM

## 2024-10-24 PROCEDURE — 0502F SUBSEQUENT PRENATAL CARE: CPT | Performed by: ADVANCED PRACTICE MIDWIFE

## 2024-10-24 NOTE — PROGRESS NOTES
Pt is here today at her 38.4 pnv   Pt states fetal movement is good   Pt has no concerns     
education) and non face-to-face time (care coordination), spent in determining the total time component.     On this date October 24, 2024, I have spent greater than 50% of this visit:  [x] Reviewing previous notes/pre-charting  [x] Reviewing test results  [x] Performing necessary physical exam/evaluation  [] Ordering medications, tests, procedures  [] Placing referrals or communicating with other HCP  [x] Documenting and updating Problem List as necessary  [x] Importance of compliance with treatment plan discussed  [x] Counseling patient/support person  [] Coordinating care    Electronically signed by: FLACA Mann CNM

## 2024-10-31 ENCOUNTER — TELEPHONE (OUTPATIENT)
Dept: OBGYN CLINIC | Age: 34
End: 2024-10-31

## 2024-10-31 ENCOUNTER — ROUTINE PRENATAL (OUTPATIENT)
Dept: OBGYN CLINIC | Age: 34
End: 2024-10-31

## 2024-10-31 VITALS
DIASTOLIC BLOOD PRESSURE: 76 MMHG | WEIGHT: 170.3 LBS | BODY MASS INDEX: 29.23 KG/M2 | HEART RATE: 89 BPM | SYSTOLIC BLOOD PRESSURE: 112 MMHG

## 2024-10-31 DIAGNOSIS — F41.9 ANXIETY: ICD-10-CM

## 2024-10-31 DIAGNOSIS — Z34.90 NORMAL REPEAT PREGNANCY, ANTEPARTUM: Primary | ICD-10-CM

## 2024-10-31 PROCEDURE — 0502F SUBSEQUENT PRENATAL CARE: CPT | Performed by: ADVANCED PRACTICE MIDWIFE

## 2024-10-31 NOTE — PROGRESS NOTES
SUBJECTIVE:  Morelia is here for her routine OB visit.  She reports fetal movement.  She denies vaginal bleeding.  She denies leaking of fluid.  She denies vaginal discharge.  She denies uterine contraction activity.  She denies nausea and/or vomiting.  She denies retaining fluid in her extremities  She denies headache, visual changes, epigastric discomfort  Morelia reports feeling well. She is very concerned about the finding of BPD and HC as <1% on US today. Discussed that we would send the US to the physician for reading and inform her of findings an a plan right away. She does report some early labor symptoms. We discussed what induction would look like if that is what is determined as best course of action. Reviewed with her past US and how baby had been measuring previously.     OBJECTIVE:   Blood pressure 112/76, pulse 89, weight 77.2 kg (170 lb 4.8 oz), last menstrual period 01/10/2024, currently breastfeeding.    Pregravid BMI: 24.02   TW.7 kg (30 lb 4.8 oz)    SVE:  Deferred    Morelia has received the Tdap vaccine as appropriate  Rhogam was not indicated    ASSESSMENT/PLAN:  IUP @ 39w4d  Morelia will monitor for fetal movements daily  [x]  GBS culture was not obtained.  Results were reviewed  [x]  28 week lab results were reviewed. Glucola 99, Hgb 11.9.   [x]  Signs of labor to report were  and when to call midwives was discussed  [x]  Birth preferences were discussed.  []  Has been given 36 week birth packet for review.  []  Signs and symptoms of Pre-Eclampsia were not reviewed and discussed.  []  Post-dates testing and protocol were not discussed  [x]  Morelia was counseled regarding Post Partum Depression and help  []  She has not decided on contraceptive choice.  [x]  Chart has been reviewed by collaborating physician.     S < D    Morelia was seen today for routine prenatal visit.    Diagnoses and all orders for this visit:    Normal repeat pregnancy, antepartum    Anxiety (on SSRI)       []  Induction

## 2024-10-31 NOTE — TELEPHONE ENCOUNTER
Patient called said that someone was to call her about her US results and measurements. Please advise?

## 2024-11-01 ENCOUNTER — HOSPITAL ENCOUNTER (INPATIENT)
Age: 34
LOS: 2 days | Discharge: HOME HEALTH | End: 2024-11-03
Attending: OBSTETRICS & GYNECOLOGY | Admitting: OBSTETRICS & GYNECOLOGY
Payer: COMMERCIAL

## 2024-11-01 PROBLEM — Z34.90 ENCOUNTER FOR INDUCTION OF LABOR: Status: ACTIVE | Noted: 2024-11-01

## 2024-11-01 LAB
ABO + RH BLD: NORMAL
AMPHET UR QL SCN: NEGATIVE
ARM BAND NUMBER: NORMAL
BARBITURATES UR QL SCN: NEGATIVE
BASOPHILS # BLD: 0.03 K/UL (ref 0–0.2)
BASOPHILS NFR BLD: 0 % (ref 0–2)
BENZODIAZ UR QL: NEGATIVE
BLOOD BANK SAMPLE EXPIRATION: NORMAL
BLOOD GROUP ANTIBODIES SERPL: NEGATIVE
CANNABINOIDS UR QL SCN: NEGATIVE
COCAINE UR QL SCN: NEGATIVE
EOSINOPHIL # BLD: 0.03 K/UL (ref 0–0.44)
EOSINOPHILS RELATIVE PERCENT: 0 % (ref 1–4)
ERYTHROCYTE [DISTWIDTH] IN BLOOD BY AUTOMATED COUNT: 12.7 % (ref 11.8–14.4)
FENTANYL UR QL: NEGATIVE
HCT VFR BLD AUTO: 36.7 % (ref 36.3–47.1)
HGB BLD-MCNC: 12.3 G/DL (ref 11.9–15.1)
IMM GRANULOCYTES # BLD AUTO: 0.03 K/UL (ref 0–0.3)
IMM GRANULOCYTES NFR BLD: 0 %
LYMPHOCYTES NFR BLD: 1.73 K/UL (ref 1.1–3.7)
LYMPHOCYTES RELATIVE PERCENT: 17 % (ref 24–43)
MCH RBC QN AUTO: 31.2 PG (ref 25.2–33.5)
MCHC RBC AUTO-ENTMCNC: 33.5 G/DL (ref 28.4–34.8)
MCV RBC AUTO: 93.1 FL (ref 82.6–102.9)
METHADONE UR QL: NEGATIVE
MONOCYTES NFR BLD: 0.72 K/UL (ref 0.1–1.2)
MONOCYTES NFR BLD: 7 % (ref 3–12)
NEUTROPHILS NFR BLD: 76 % (ref 36–65)
NEUTS SEG NFR BLD: 7.68 K/UL (ref 1.5–8.1)
NRBC BLD-RTO: 0 PER 100 WBC
OPIATES UR QL SCN: NEGATIVE
OXYCODONE UR QL SCN: NEGATIVE
PCP UR QL SCN: NEGATIVE
PLATELET # BLD AUTO: 177 K/UL (ref 138–453)
PMV BLD AUTO: 10.6 FL (ref 8.1–13.5)
RBC # BLD AUTO: 3.94 M/UL (ref 3.95–5.11)
T PALLIDUM AB SER QL IA: NONREACTIVE
TEST INFORMATION: NORMAL
WBC OTHER # BLD: 10.2 K/UL (ref 3.5–11.3)

## 2024-11-01 PROCEDURE — 1220000000 HC SEMI PRIVATE OB R&B

## 2024-11-01 PROCEDURE — 86850 RBC ANTIBODY SCREEN: CPT

## 2024-11-01 PROCEDURE — 86900 BLOOD TYPING SEROLOGIC ABO: CPT

## 2024-11-01 PROCEDURE — 6360000002 HC RX W HCPCS: Performed by: ADVANCED PRACTICE MIDWIFE

## 2024-11-01 PROCEDURE — 6360000002 HC RX W HCPCS

## 2024-11-01 PROCEDURE — 6370000000 HC RX 637 (ALT 250 FOR IP)

## 2024-11-01 PROCEDURE — 86901 BLOOD TYPING SEROLOGIC RH(D): CPT

## 2024-11-01 PROCEDURE — 80307 DRUG TEST PRSMV CHEM ANLYZR: CPT

## 2024-11-01 PROCEDURE — 3E0P7VZ INTRODUCTION OF HORMONE INTO FEMALE REPRODUCTIVE, VIA NATURAL OR ARTIFICIAL OPENING: ICD-10-PCS | Performed by: OBSTETRICS & GYNECOLOGY

## 2024-11-01 PROCEDURE — 86780 TREPONEMA PALLIDUM: CPT

## 2024-11-01 PROCEDURE — 85025 COMPLETE CBC W/AUTO DIFF WBC: CPT

## 2024-11-01 RX ORDER — MORPHINE SULFATE 10 MG/ML
10 INJECTION INTRAVENOUS ONCE
Status: COMPLETED | OUTPATIENT
Start: 2024-11-01 | End: 2024-11-01

## 2024-11-01 RX ORDER — TRANEXAMIC ACID 10 MG/ML
1000 INJECTION, SOLUTION INTRAVENOUS
Status: DISCONTINUED | OUTPATIENT
Start: 2024-11-01 | End: 2024-11-02

## 2024-11-01 RX ORDER — METHYLERGONOVINE MALEATE 0.2 MG/ML
200 INJECTION INTRAVENOUS PRN
Status: DISCONTINUED | OUTPATIENT
Start: 2024-11-01 | End: 2024-11-02

## 2024-11-01 RX ORDER — SODIUM CHLORIDE, SODIUM LACTATE, POTASSIUM CHLORIDE, AND CALCIUM CHLORIDE .6; .31; .03; .02 G/100ML; G/100ML; G/100ML; G/100ML
500 INJECTION, SOLUTION INTRAVENOUS PRN
Status: DISCONTINUED | OUTPATIENT
Start: 2024-11-01 | End: 2024-11-02

## 2024-11-01 RX ORDER — MISOPROSTOL 100 UG/1
400 TABLET ORAL PRN
Status: DISCONTINUED | OUTPATIENT
Start: 2024-11-01 | End: 2024-11-02

## 2024-11-01 RX ORDER — SODIUM CHLORIDE 9 MG/ML
25 INJECTION, SOLUTION INTRAVENOUS PRN
Status: DISCONTINUED | OUTPATIENT
Start: 2024-11-01 | End: 2024-11-02

## 2024-11-01 RX ORDER — SODIUM CHLORIDE 0.9 % (FLUSH) 0.9 %
5-40 SYRINGE (ML) INJECTION EVERY 12 HOURS SCHEDULED
Status: DISCONTINUED | OUTPATIENT
Start: 2024-11-01 | End: 2024-11-02

## 2024-11-01 RX ORDER — SODIUM CHLORIDE, SODIUM LACTATE, POTASSIUM CHLORIDE, CALCIUM CHLORIDE 600; 310; 30; 20 MG/100ML; MG/100ML; MG/100ML; MG/100ML
INJECTION, SOLUTION INTRAVENOUS CONTINUOUS
Status: DISCONTINUED | OUTPATIENT
Start: 2024-11-01 | End: 2024-11-02

## 2024-11-01 RX ORDER — DOCUSATE SODIUM 100 MG/1
100 CAPSULE, LIQUID FILLED ORAL 2 TIMES DAILY
Status: DISCONTINUED | OUTPATIENT
Start: 2024-11-01 | End: 2024-11-02

## 2024-11-01 RX ORDER — SODIUM CHLORIDE 0.9 % (FLUSH) 0.9 %
5-40 SYRINGE (ML) INJECTION PRN
Status: DISCONTINUED | OUTPATIENT
Start: 2024-11-01 | End: 2024-11-02

## 2024-11-01 RX ORDER — PROCHLORPERAZINE EDISYLATE 5 MG/ML
10 INJECTION INTRAMUSCULAR; INTRAVENOUS ONCE
Status: COMPLETED | OUTPATIENT
Start: 2024-11-01 | End: 2024-11-01

## 2024-11-01 RX ORDER — CARBOPROST TROMETHAMINE 250 UG/ML
250 INJECTION, SOLUTION INTRAMUSCULAR PRN
Status: DISCONTINUED | OUTPATIENT
Start: 2024-11-01 | End: 2024-11-02

## 2024-11-01 RX ORDER — TERBUTALINE SULFATE 1 MG/ML
0.25 INJECTION, SOLUTION SUBCUTANEOUS
Status: DISCONTINUED | OUTPATIENT
Start: 2024-11-01 | End: 2024-11-02

## 2024-11-01 RX ORDER — ONDANSETRON 2 MG/ML
4 INJECTION INTRAMUSCULAR; INTRAVENOUS EVERY 6 HOURS PRN
Status: DISCONTINUED | OUTPATIENT
Start: 2024-11-01 | End: 2024-11-02

## 2024-11-01 RX ORDER — ONDANSETRON 4 MG/1
4 TABLET, ORALLY DISINTEGRATING ORAL EVERY 6 HOURS PRN
Status: DISCONTINUED | OUTPATIENT
Start: 2024-11-01 | End: 2024-11-02

## 2024-11-01 RX ADMIN — Medication 50 MCG: at 16:10

## 2024-11-01 RX ADMIN — MORPHINE SULFATE 10 MG: 10 INJECTION INTRAVENOUS at 22:43

## 2024-11-01 RX ADMIN — PROCHLORPERAZINE EDISYLATE 10 MG: 5 INJECTION INTRAMUSCULAR; INTRAVENOUS at 22:44

## 2024-11-01 ASSESSMENT — PAIN DESCRIPTION - ORIENTATION: ORIENTATION: MID

## 2024-11-01 ASSESSMENT — PAIN DESCRIPTION - LOCATION: LOCATION: ABDOMEN;BACK

## 2024-11-01 ASSESSMENT — PAIN SCALES - GENERAL: PAINLEVEL_OUTOF10: 3

## 2024-11-01 ASSESSMENT — PAIN DESCRIPTION - DESCRIPTORS: DESCRIPTORS: CRAMPING

## 2024-11-01 NOTE — FLOWSHEET NOTE
Patient admitted for IOL, no bleeding, LOF or ctx. +fm per patient. EFM applied with FHTs 149, abdomen palpating soft. Leandra Moscosoew on unit and aware of patients arrival.

## 2024-11-01 NOTE — H&P
[facility] Obstetrics History and Physical  2024  3:35 PM    SUBJECTIVE:    CHIEF COMPLAINT:  Induction of Labor     HISTORY OF PRESENT ILLNESS:      The patient is a 33 y.o. female at 39w5d.    Morelia presents with a chief complaint as above and is being admitted for induction    Course of pregnancy complicated by:     Patient Active Problem List   Diagnosis    Normal repeat pregnancy, antepartum    Low vitamin D level    Anxiety (on SSRI)    Family history of pre-eclampsia (mother)    Positive urine drug screen (FALSE POSITIVE)    RECEIVED tdap       OBJECTIVE:     Estimated Due Date:   Estimated Date of Delivery: 11/3/24   Patient's last menstrual period was 01/10/2024 (exact date).    Confirmed by: Early US at 6w    PRENATAL LABS:    Blood Type/Rh: O pos  Antibody Screen: negative  Hemoglobin, Hematocrit, Platelets: 12.3 / 36.7 / 177  Rubella: immune  T. Pallidum, IgG: non-reactive   Hepatitis B Surface Antigen: non-reactive   HIV: non-reactive   Sickle Cell Screen: not done  Gonorrhea: negative  Chlamydia: negative  Urine culture: negative, date: 3/27/24    1 hour Glucose Tolerance Test: 99    Group B Strep: negative  Cystic Fibrosis Screen: patient declined  First Trimester Screen: patient declined  MSAFP/Multiple Markers: patient declined  Non-Invasive Prenatal Testing: patient declined     Steroids:  no    PAST OB HISTORY:  OB History    Para Term  AB Living   5 3 3 0 1 3   SAB IAB Ectopic Molar Multiple Live Births   1 0 0 0 0 3      # Outcome Date GA Lbr Matt/2nd Weight Sex Type Anes PTL Lv   5 Current            4 Term 22 40w0d  3.629 kg (8 lb) F Vag-Spont EPI N YOVANA   3 Term 19 39w4d  3.175 kg (7 lb) M Vag-Spont EPI N YOVANA   2 Term 16 41w0d  2.722 kg (6 lb) F Vag-Spont EPI N YOVANA   1 SAB      Vag-Spont   YOVANA      Obstetric Comments   [id number]-[id number]-Antonio       Past Medical History:        Diagnosis Date    Anxiety     Depression        Past Surgical

## 2024-11-02 PROCEDURE — 59400 OBSTETRICAL CARE: CPT | Performed by: ADVANCED PRACTICE MIDWIFE

## 2024-11-02 PROCEDURE — 1220000000 HC SEMI PRIVATE OB R&B

## 2024-11-02 PROCEDURE — 6360000002 HC RX W HCPCS: Performed by: ADVANCED PRACTICE MIDWIFE

## 2024-11-02 PROCEDURE — 7200000001 HC VAGINAL DELIVERY

## 2024-11-02 PROCEDURE — 6370000000 HC RX 637 (ALT 250 FOR IP)

## 2024-11-02 RX ORDER — SODIUM CHLORIDE 0.9 % (FLUSH) 0.9 %
5-40 SYRINGE (ML) INJECTION EVERY 12 HOURS SCHEDULED
Status: DISCONTINUED | OUTPATIENT
Start: 2024-11-02 | End: 2024-11-03 | Stop reason: HOSPADM

## 2024-11-02 RX ORDER — ACETAMINOPHEN 500 MG
1000 TABLET ORAL EVERY 6 HOURS PRN
Qty: 30 TABLET | Refills: 1 | Status: SHIPPED | OUTPATIENT
Start: 2024-11-02

## 2024-11-02 RX ORDER — OXYTOCIN 10 [USP'U]/ML
10 INJECTION, SOLUTION INTRAMUSCULAR; INTRAVENOUS ONCE
Status: COMPLETED | OUTPATIENT
Start: 2024-11-02 | End: 2024-11-02

## 2024-11-02 RX ORDER — ACETAMINOPHEN 500 MG
1000 TABLET ORAL EVERY 6 HOURS SCHEDULED
Status: DISCONTINUED | OUTPATIENT
Start: 2024-11-02 | End: 2024-11-03 | Stop reason: HOSPADM

## 2024-11-02 RX ORDER — LANOLIN/MINERAL OIL
LOTION (ML) TOPICAL PRN
Status: DISCONTINUED | OUTPATIENT
Start: 2024-11-02 | End: 2024-11-03 | Stop reason: HOSPADM

## 2024-11-02 RX ORDER — ONDANSETRON 4 MG/1
4 TABLET, ORALLY DISINTEGRATING ORAL EVERY 6 HOURS PRN
Status: DISCONTINUED | OUTPATIENT
Start: 2024-11-02 | End: 2024-11-03 | Stop reason: HOSPADM

## 2024-11-02 RX ORDER — IBUPROFEN 600 MG/1
600 TABLET, FILM COATED ORAL EVERY 6 HOURS PRN
Qty: 40 TABLET | Refills: 1 | Status: SHIPPED | OUTPATIENT
Start: 2024-11-02

## 2024-11-02 RX ORDER — SIMETHICONE 80 MG
80 TABLET,CHEWABLE ORAL EVERY 6 HOURS PRN
Status: DISCONTINUED | OUTPATIENT
Start: 2024-11-02 | End: 2024-11-03 | Stop reason: HOSPADM

## 2024-11-02 RX ORDER — SODIUM CHLORIDE 0.9 % (FLUSH) 0.9 %
5-40 SYRINGE (ML) INJECTION PRN
Status: DISCONTINUED | OUTPATIENT
Start: 2024-11-02 | End: 2024-11-03 | Stop reason: HOSPADM

## 2024-11-02 RX ORDER — SODIUM CHLORIDE 9 MG/ML
INJECTION, SOLUTION INTRAVENOUS PRN
Status: DISCONTINUED | OUTPATIENT
Start: 2024-11-02 | End: 2024-11-03 | Stop reason: HOSPADM

## 2024-11-02 RX ORDER — SENNA AND DOCUSATE SODIUM 50; 8.6 MG/1; MG/1
2 TABLET, FILM COATED ORAL 2 TIMES DAILY
Status: DISCONTINUED | OUTPATIENT
Start: 2024-11-02 | End: 2024-11-03 | Stop reason: HOSPADM

## 2024-11-02 RX ORDER — ONDANSETRON 2 MG/ML
4 INJECTION INTRAMUSCULAR; INTRAVENOUS EVERY 6 HOURS PRN
Status: DISCONTINUED | OUTPATIENT
Start: 2024-11-02 | End: 2024-11-03 | Stop reason: HOSPADM

## 2024-11-02 RX ORDER — IBUPROFEN 600 MG/1
600 TABLET, FILM COATED ORAL EVERY 6 HOURS SCHEDULED
Status: DISCONTINUED | OUTPATIENT
Start: 2024-11-02 | End: 2024-11-03 | Stop reason: HOSPADM

## 2024-11-02 RX ORDER — SENNA AND DOCUSATE SODIUM 50; 8.6 MG/1; MG/1
1 TABLET, FILM COATED ORAL 2 TIMES DAILY
Qty: 60 TABLET | Refills: 0 | Status: SHIPPED | OUTPATIENT
Start: 2024-11-02

## 2024-11-02 RX ADMIN — IBUPROFEN 600 MG: 600 TABLET, FILM COATED ORAL at 08:17

## 2024-11-02 RX ADMIN — ACETAMINOPHEN 1000 MG: 500 TABLET ORAL at 19:19

## 2024-11-02 RX ADMIN — ACETAMINOPHEN 1000 MG: 500 TABLET ORAL at 08:18

## 2024-11-02 RX ADMIN — OXYTOCIN 10 UNITS: 10 INJECTION INTRAVENOUS at 01:48

## 2024-11-02 RX ADMIN — SENNOSIDES AND DOCUSATE SODIUM 2 TABLET: 50; 8.6 TABLET ORAL at 08:25

## 2024-11-02 RX ADMIN — SERTRALINE HYDROCHLORIDE 150 MG: 50 TABLET ORAL at 08:28

## 2024-11-02 ASSESSMENT — PAIN DESCRIPTION - DESCRIPTORS: DESCRIPTORS: CRAMPING

## 2024-11-02 ASSESSMENT — PAIN SCALES - GENERAL
PAINLEVEL_OUTOF10: 2
PAINLEVEL_OUTOF10: 2

## 2024-11-02 ASSESSMENT — PAIN DESCRIPTION - LOCATION: LOCATION: ABDOMEN

## 2024-11-02 NOTE — FLOWSHEET NOTE
Patient admitted to room 415 from L&D via wheelchair.   Oriented to room and surroundings.  Plan of care reviewed.  Verbalized understanding.  Instructed on infant security and safe sleep practices.  Preventing falls education provided .The following handouts given: A New Beginning: Your Guide to Postpartum Care, Rounding, gs Security System,Babies Cry A lot, Safe Sleep, Security and Visitation Guidelines.   Call light placed within reach.

## 2024-11-02 NOTE — L&D DELIVERY NOTE
stable    Details of Procedure:   The patient is a 33 y.o. female at 39w6d holcomb   OB History          5    Para   3    Term   3            AB   1    Living   3         SAB   1    IAB        Ectopic        Molar        Multiple        Live Births   3          Obstetric Comments   Y1-Y0-[id number]-Antonio            who was admitted for induction. She received the following interventions: vaginal Cytotec x1 & canseco placement for cervical ripening. Canseco balloon came out at 0129. At 0136 CNM at bedside and pt then became increasingly painful standing at the bedside, SROM then started spontaneously pushing.  was noted. After pushing, the fetal head was at the perineum, gentle downward pressure placed to facilitate the delivery of the anterior shoulder, and the rest of the infant delivered atraumatically. The  had good tone but little respiratory effort. CNM stimulated . The cord was clamped after 30 seconds delay and cut & handed off to nursing. Pitocin was given IM. The delivery of the placenta was spontaneous. The perineum and vagina were explored and no laceration was found. Cervix intact. Uterus firm. Bleeding has good hemostasis upon leaving the room. Mom and  are stable.     Viable male  Amniotic Fluid: clear  Fetal Delivery Position: FRANCISCA  Maternal Birthing position: standing  Nuchal cord: 0    Dr. Gee called for delivery, was present for placenta      FLACA Marie CNM  Midwife  2024, 2:23 AM

## 2024-11-02 NOTE — FLOWSHEET NOTE
Patient called out due to moderate amount of clots in toilet. RN at bedside, firm U/U. Patient encouraged to call out with any concerns, patient verbalized understanding. Midwife Loren dickerson.

## 2024-11-02 NOTE — PROGRESS NOTES
[facility] Labor Note    SUBJECTIVE:    Patient is working well with early stages of induction. Comfort measures include , assisting with relief. Support system helpful.     OBJECTIVE:     VS:  oral temperature is 98.6 °F (37 °C). Her blood pressure is 136/83 and her pulse is 85. Her respiration is 16 and oxygen saturation is 97%.     FHT:    Baseline: 130   Variability: moderate              Accels: present   Decels: Absent    Scalp electrode: No    Contraction pattern:                                  Frequency: 2-4                                 Duration: 60-90                                 Intensity: Mild                                 Pitocin: no                                 IUPC:  No    Cervix:             Dilation: 1            Effacement: 50            Station: -3            Position: Posterior             Consistency: Moderate    Status of membranes: Intact     Pain control: Denies need for pain meds     Maternal status (hydration, fatigue, voids): WNL      ASSESSMENT/PLAN:  Morelia Flannery is a 33 y.o. female   At 39w5d  Elective Induction of Labor:   GBS negative, no need for GBS prophylaxis   Regular diet   IV saline lock   cEFM and cToco  SVE: /-3  S/p 50 mcg PV cytotec  Beck balloon was placed with 60ml sterile water  Residents involved and updated  FHR: Category 1

## 2024-11-02 NOTE — PROGRESS NOTES
[facility]  [facility] LABOR & DELIVERY  [address] [facility] [address]  Dept: [phone]  Loc: [phone]  LABOR PROGRESS NOTE      Patient Name: Morelia Flannery  Patient : 1990  Room/Bed: 0708/0708-01  Admission Date/Time: 2024  3:02 PM  MRN #: [id number]  [facility] #: [id number]    Date: 2024  Time: 10:35 PM    The patient was seen and examined. Her pain is not well controlled. Pt reports she is very crampy that has started since the insertion of balloon. She is not able to rest and its making her anxious that she is feeling so uncomfortable this early in her induction.    She reports fetal movement is present, complains of contractions, denies loss of fluid, denies vaginal bleeding.   Denies s/s of preeclampsia including headache vision changes, difficulty breathing, chest pain, RUQ pain or worsening swelling of extremities.        Vital Signs:  VS:  oral temperature is 98.4 °F (36.9 °C). Her blood pressure is 136/81 and her pulse is 75. Her respiration is 16 and oxygen saturation is 98%.     FHT:    Baseline: 120   Variability: moderate              Accels: present   Decels: Absent    Contraction pattern:                                  Frequency: q1.5-3min                                 Duration: 70-90                                 Intensity: mild                                 Pitocin: 0                                 IUPC:  No    Cervix: deferred, canseco in place    Status of membranes: intact    Pain control: not coping at this time    Maternal status (hydration, fatigue, voids): WNL    Interventions: discussed options of medication for discomfort, canseco removal. Pt desires to try medication at this time.     Assessment/Plan:  Morelia Flannery is a 33 y.o. female  at 39w5d admitted for eIOL   - cEFM/ TOCO  - GBS negative,    - VSS, Afebrile    - IVSL   - Induction method: cytotec 50mcg PV x1    -s/p morphine/ compazine IM @2246   - anticipate    - ROM x 0  General

## 2024-11-02 NOTE — PROGRESS NOTES
Obstetric/Gynecology Resident Interval Note    Resident to bedside to place transcervical canseco balloon per CNM. Upon entry to room patient is resting comfortably in bed. SVE 1/50/-3. Transcervical canseco balloon placed without difficulty, inflated with 60cc saline, and patient tolerated well.     Fetal Heart Monitor:  Baseline Heart Rate 145, moderate variability, present accelerations, absent decelerations  Wilkerson: contractions, regular, every 2 minutes    Continue induction of labor per CNM and monitor closely.    Delia Collins DO  OB/GYN Resident, PGY2  [place], [place]  11/1/2024, 8:41 PM

## 2024-11-02 NOTE — CONSULTS
Mom reports her baby is nursing well and comfortably. Packet of breastfeeding information given. Encouraged her to call out for assistance as needed.

## 2024-11-02 NOTE — CARE COORDINATION
CASE MANAGEMENT POST-PARTUM TRANSITIONAL CARE PLAN    Encounter for induction of labor [Z34.90]    OB Provider: MMW    Writer met w/ Morelia at her bedside to discuss DCP. She is S/P  on 24 @ 39w6d at 0140 of male infant    Writer verified address/phone number correct on facesheet. She states she lives with her  and their 3 other children. She denied barriers with transportation home, to doctor's appointments or with paying for medications upon discharge home.     [employer] insurance correct. Writer notified her they have 30 days from date of birth to add  to insurance policy. She verbalized understanding.    Infant name on BC: Velasquez.   Infant PCP Dr. Antunez.     DME: Glucometer & supplies to monitor BS  HOME CARE: none    Anticipate DC home of couplet in private vehicle in 1-2 days status post vaginal delivery.    Readmission Risk              Risk of Unplanned Readmission:  4

## 2024-11-03 VITALS
OXYGEN SATURATION: 99 % | TEMPERATURE: 98 F | SYSTOLIC BLOOD PRESSURE: 126 MMHG | RESPIRATION RATE: 16 BRPM | DIASTOLIC BLOOD PRESSURE: 78 MMHG | HEART RATE: 70 BPM

## 2024-11-03 PROBLEM — Z34.90 ENCOUNTER FOR INDUCTION OF LABOR: Status: RESOLVED | Noted: 2024-11-01 | Resolved: 2024-11-03

## 2024-11-03 PROCEDURE — 6370000000 HC RX 637 (ALT 250 FOR IP)

## 2024-11-03 RX ADMIN — SENNOSIDES AND DOCUSATE SODIUM 2 TABLET: 50; 8.6 TABLET ORAL at 08:48

## 2024-11-03 RX ADMIN — ACETAMINOPHEN 1000 MG: 500 TABLET ORAL at 08:48

## 2024-11-03 RX ADMIN — IBUPROFEN 600 MG: 600 TABLET, FILM COATED ORAL at 01:13

## 2024-11-03 RX ADMIN — SERTRALINE HYDROCHLORIDE 150 MG: 50 TABLET ORAL at 08:48

## 2024-11-03 RX ADMIN — SENNOSIDES AND DOCUSATE SODIUM 2 TABLET: 50; 8.6 TABLET ORAL at 01:13

## 2024-11-03 ASSESSMENT — PAIN SCALES - GENERAL: PAINLEVEL_OUTOF10: 2

## 2024-11-03 NOTE — LACTATION NOTE
Mother reports breastfeeding is going very well. She has no concerns at this time and plans to be discharged home. Reviewed discharge for breastfeeding mother's. She verbalized understanding. Encouraged her to call out as needed.

## 2024-11-03 NOTE — PROGRESS NOTES
CLINICAL PHARMACY NOTE: MEDS TO BEDS    Total # of Prescriptions Filled: 3   The following medications were delivered to the patient:  Ibuprofen  Senna plus  Acetaminophen      Additional Documentation:  $5.88 kevin cullen

## 2024-11-03 NOTE — PROGRESS NOTES
[facility]   Vaginal Postpartum Note  Hospital Day: 3  Postpartum Day: 1      SUBJECTIVE:  Voices no concerns today. Doing well. Voiding, ambulating, eating without difficulty. Denies any leg pain. Bonding with baby. Resting well. Lochia is light. Reports pain/cramping is controlled with oral meds. She denies headache, vision changes, RUQ pain, epigastric pain, swelling.    OBJECTIVE:     Vitals:  /78   Pulse 70   Temp 98 °F (36.7 °C) (Oral)   Resp 16   LMP 01/10/2024 (Exact Date)   SpO2 99%   Breastfeeding Unknown     Constitutional:  Awake, alert, cooperative, no apparent distress. Appears to be bonding well with baby, does not appear overly stressed with infant handling.    Pain: intermittent abdominal cramping, improves with oral meds.     Breasts:  Soft without tenderness, nipples are intact.    Abdominal Exam: Soft, non-tender, lax muscle tone. Fundus is mid-line, firm.  Non-tender with palpation. Bladder non-distended.    Perineum: Intact and healing per patient    Lochia: Small and Rubra    Extremities: Negative Kaci sign. Minimal edema.    Voiding QS, passing flatus    Labs:   Lab Results   Component Value Date/Time    HGB 12.3 2024 03:53 PM    HCT 36.7 2024 03:53 PM       ASSESSMENT/PLAN:     Morelia Flannery is a  post partum vaginal birth Day 1  Continue PP care. Encourage rest, hydration, and ambulation as tolerated.  VSS  Hemoglobin/hematocrit if symptomatic  Reviewed birth experience and she is happy with experience although it was faster than anticipated  Discharge instructions were reviewed regarding perineal care, breast care, activity, rest, diet, secondhand smoke and increased SIDS risk, hygiene and PP depression. Questions were answered.  Discharge planned for today  RTO in 2 weeks    Breastfeeding without difficulty  Breast care reviewed  Denies s/s mastitis    Rh positive/Rubella immune    MORGAN Castillo  Midwife  11/3/2024  9:29 AM

## 2024-11-03 NOTE — FLOWSHEET NOTE
I have reviewed all [facility] Post-Birth Warning Signs and essential teaching points for pulmonary embolism, cardiac disease, hypertensive disorders of pregnancy, obstetric hemorrhage, venous thromboembolism, infection, and postpartum depression with the patient and  (support person) . I have informed the patient on when to call their healthcare provider and when to call 911. I have discussed with the patient  the importance of scheduling a follow-up visit with their physician, nurse practitioner or midwife and provided them with correct contact information for appointment. I have provided the patient with a copy of the \"Save Your Life\" handout. The patient has acknowledged receiving and understanding this education with her signature.

## 2024-11-03 NOTE — DISCHARGE SUMMARY
Obstetric Discharge Summary  [facility]    Patient Name: Morelia Flannery  Patient : 1990  Primary Care Physician: None, None  Admit Date: 2024    Principal Diagnosis: IUP at 39w6d, admitted for Induction of Labor       Her pregnancy has been complicated by:   Patient Active Problem List   Diagnosis    Normal repeat pregnancy, antepartum    Low vitamin D level    Anxiety (on SSRI)    Family history of pre-eclampsia (mother)    Positive urine drug screen (FALSE POSITIVE)    RECEIVED tdap    Encounter for induction of labor     24 M Apg 4/8 Wt 7#8       Infection Present?: No  Hospital Acquired: N/A    Surgical Operations & Procedures:  [] Pitocin Induction of Labor  [] Pitocin Augmentation of Labor  [] Prostaglandin Induction of Labor  [x] Cytotec (Misoprostol)  [x] Mechanical Induction of Labor  [] Artificial Rupture of Membranes  [] Intrauterine Pressure Catheter  [] Fetal Scalp Electrode  [] Amnioinfusion    Analgesia: none  Delivery Type: Spontaneous Vaginal Delivery: See Labor and Delivery Summary   Laceration(s): Absent    Consultations: none    Pertinent Findings & Procedures:   Morelia Flannery is a 33 y.o. female  at 39w6d admitted for induction; received Cytotec x 1 and canseco balloon. She delivered by spontaneous vaginal a Live Born      Information for the patient's :  Norberto Flannery [[id number]]   male   Birth Weight: 3.38 kg (7 lb 7.2 oz)    Apgars:   Information for the patient's :  Norberto Flannery [[id number]]        Postpartum course: normal.      Course of patient: uncomplicated    Discharge to: Home    Readmission planned: no     Recommendations on Discharge:     Medications:        Medication List        START taking these medications      acetaminophen 500 MG tablet  Commonly known as: TYLENOL  Take 2 tablets by mouth every 6 hours as needed for Pain     ibuprofen 600 MG tablet  Commonly known as: ADVIL;MOTRIN  Take 1 tablet by mouth

## 2024-11-03 NOTE — PLAN OF CARE
Problem: Vaginal Birth or  Section  Goal: Fetal and maternal status remain reassuring during the birth process  Description:  Birth OB-Pregnancy care plan goal which identifies if the fetal and maternal status remain reassuring during the birth process  Outcome: Completed     Problem: Pain  Goal: Verbalizes/displays adequate comfort level or baseline comfort level  Outcome: Completed     Problem: Infection - Adult  Goal: Absence of infection at discharge  Outcome: Completed  Goal: Absence of infection during hospitalization  Outcome: Completed  Goal: Absence of fever/infection during anticipated neutropenic period  Outcome: Completed     Problem: Safety - Adult  Goal: Free from fall injury  Outcome: Completed     Problem: Discharge Planning  Goal: Discharge to home or other facility with appropriate resources  Outcome: Completed     Problem: Chronic Conditions and Co-morbidities  Goal: Patient's chronic conditions and co-morbidity symptoms are monitored and maintained or improved  Outcome: Completed     Problem: Postpartum  Goal: Experiences normal postpartum course  Description:  Postpartum OB-Pregnancy care plan goal which identifies if the mother is experiencing a normal postpartum course  Outcome: Completed  Goal: Appropriate maternal -  bonding  Description:  Postpartum OB-Pregnancy care plan goal which identifies if the mother and  are bonding appropriately  Outcome: Completed  Goal: Establishment of infant feeding pattern  Description:  Postpartum OB-Pregnancy care plan goal which identifies if the mother is establishing a feeding pattern with their   Outcome: Completed  Goal: Incisions, wounds, or drain sites healing without S/S of infection  Outcome: Completed

## 2024-11-04 ENCOUNTER — TELEPHONE (OUTPATIENT)
Dept: OBGYN CLINIC | Age: 34
End: 2024-11-04

## 2024-11-04 NOTE — TELEPHONE ENCOUNTER
----- Message from Sury APONTE sent at 11/3/2024  3:54 PM EST -----  Hi, she needs 2 and 6 week PP visits. Thanks!!

## 2024-11-14 ENCOUNTER — OFFICE VISIT (OUTPATIENT)
Dept: PRIMARY CARE CLINIC | Age: 34
End: 2024-11-14

## 2024-11-14 VITALS
SYSTOLIC BLOOD PRESSURE: 124 MMHG | HEART RATE: 97 BPM | HEIGHT: 64 IN | OXYGEN SATURATION: 100 % | DIASTOLIC BLOOD PRESSURE: 76 MMHG | WEIGHT: 155 LBS | BODY MASS INDEX: 26.46 KG/M2

## 2024-11-14 DIAGNOSIS — F42.2 MIXED OBSESSIONAL THOUGHTS AND ACTS: Primary | ICD-10-CM

## 2024-11-14 DIAGNOSIS — R79.89 LOW VITAMIN D LEVEL: ICD-10-CM

## 2024-11-14 DIAGNOSIS — F41.9 ANXIETY: ICD-10-CM

## 2024-11-14 ASSESSMENT — PATIENT HEALTH QUESTIONNAIRE - PHQ9
SUM OF ALL RESPONSES TO PHQ QUESTIONS 1-9: 0
2. FEELING DOWN, DEPRESSED OR HOPELESS: NOT AT ALL
SUM OF ALL RESPONSES TO PHQ QUESTIONS 1-9: 0
SUM OF ALL RESPONSES TO PHQ QUESTIONS 1-9: 0
1. LITTLE INTEREST OR PLEASURE IN DOING THINGS: NOT AT ALL
DEPRESSION UNABLE TO ASSESS: FUNCTIONAL CAPACITY MOTIVATION LIMITS ACCURACY
SUM OF ALL RESPONSES TO PHQ QUESTIONS 1-9: 0
SUM OF ALL RESPONSES TO PHQ9 QUESTIONS 1 & 2: 0

## 2024-11-14 ASSESSMENT — ENCOUNTER SYMPTOMS
SORE THROAT: 0
CHEST TIGHTNESS: 0
ABDOMINAL PAIN: 0
COUGH: 0
VOMITING: 0
DIARRHEA: 0
ABDOMINAL DISTENTION: 0
SHORTNESS OF BREATH: 0
NAUSEA: 0
CONSTIPATION: 0

## 2024-11-14 NOTE — PROGRESS NOTES
Patient is here for 2 week PP Vaginal delivery 11/2/24 .   Pt  reports no concerns  EPDS form         11/18/2024     8:00 AM 3/27/2024     3:00 PM   GERI-7 SCREENING   Feeling nervous, anxious, or on edge Several days Several days   Not being able to stop or control worrying Several days Not at all   Worrying too much about different things Several days Several days   Trouble relaxing Not at all Not at all   Being so restless that it is hard to sit still Not at all Not at all   Becoming easily annoyed or irritable Several days Several days   Feeling afraid as if something awful might happen Several days Several days   GERI-7 Total Score 5 4   How difficult have these problems made it for you to do your work, take care of things at home, or get along with other people?  Not difficult at all            11/18/2024     8:06 AM 6/28/2024    10:35 AM   Postpartum Depression Scale   I have been able to laugh and see the funny side of things As much as I always could As much as I always could   I have looked forward with enjoyment to things As much as I ever did As much as I ever did   I have blamed myself unnecessarily when things went wrong Not very often Yes, some of the time   I have been anxious or worried for no good reason Yes, sometimes Yes, sometimes   I have felt scared or panicky for no good reason Yes, sometimes No, not at all   I haven't been able to cope lately No, most of the time I have coped quite well No, most of the time I have coped quite well   I have been so unhappy that I have had difficulty sleeping Not at all Not at all   I have felt sad or miserable Not very often No, not at all   I have been so unhappy that I have been crying Only occasionally No, never   The thought of harming myself has occurred to me Never Never   Total 8 5

## 2024-11-14 NOTE — PROGRESS NOTES
ibuprofen (ADVIL;MOTRIN) 600 MG tablet Take 1 tablet by mouth every 6 hours as needed for Pain 40 tablet 1    sertraline (ZOLOFT) 100 MG tablet Take 1.5 tablets by mouth daily      Probiotic Product (PROBIOTIC BLEND PO) Take by mouth      Prenatal Vit-Fe Fumarate-FA (PRENATAL VITAMINS) 28-0.8 MG TABS Take 1 tablet by mouth daily (Patient not taking: Reported on 11/14/2024) 30 tablet 11     No current facility-administered medications for this visit.     No Known Allergies    Health Maintenance   Topic Date Due    Varicella vaccine (1 of 2 - 13+ 2-dose series) Never done    Hepatitis B vaccine (1 of 3 - 19+ 3-dose series) Never done    Cervical cancer screen  Never done    Flu vaccine (1) Never done    COVID-19 Vaccine (1 - 2023-24 season) Never done    Depression Screen  04/22/2025    DTaP/Tdap/Td vaccine (2 - Td or Tdap) 09/05/2034    Hepatitis C screen  Completed    HIV screen  Completed    Hepatitis A vaccine  Aged Out    Hib vaccine  Aged Out    HPV vaccine  Aged Out    Polio vaccine  Aged Out    Meningococcal (ACWY) vaccine  Aged Out    Pneumococcal 0-64 years Vaccine  Aged Out       Subjective:      Review of Systems   Constitutional:  Negative for chills, fever and unexpected weight change.   HENT:  Negative for congestion and sore throat.    Eyes:  Positive for visual disturbance (Needs eye doctor.).   Respiratory:  Negative for cough, chest tightness and shortness of breath.    Cardiovascular:  Negative for chest pain, palpitations and leg swelling.   Gastrointestinal:  Negative for abdominal distention, abdominal pain, constipation, diarrhea, nausea and vomiting.   Genitourinary:  Negative for difficulty urinating and frequency.   Musculoskeletal:  Negative for arthralgias and myalgias.   Skin:  Negative for rash.   Neurological:  Negative for dizziness, weakness, numbness and headaches.   Psychiatric/Behavioral:  Negative for dysphoric mood and sleep disturbance. The patient is not nervous/anxious.

## 2024-11-18 ENCOUNTER — POSTPARTUM VISIT (OUTPATIENT)
Dept: OBGYN CLINIC | Age: 34
End: 2024-11-18

## 2024-11-18 VITALS
DIASTOLIC BLOOD PRESSURE: 70 MMHG | HEIGHT: 63 IN | SYSTOLIC BLOOD PRESSURE: 116 MMHG | WEIGHT: 153.2 LBS | HEART RATE: 89 BPM | BODY MASS INDEX: 27.14 KG/M2

## 2024-11-18 DIAGNOSIS — F41.9 ANXIETY: ICD-10-CM

## 2024-11-18 PROBLEM — Z34.90 NORMAL REPEAT PREGNANCY, ANTEPARTUM: Status: RESOLVED | Noted: 2024-03-11 | Resolved: 2024-11-18

## 2024-11-18 PROBLEM — Z23 NEED FOR DIPHTHERIA-TETANUS-PERTUSSIS (TDAP) VACCINE: Status: RESOLVED | Noted: 2024-08-22 | Resolved: 2024-11-18

## 2024-11-18 PROBLEM — R82.5 POSITIVE URINE DRUG SCREEN: Status: RESOLVED | Noted: 2024-04-08 | Resolved: 2024-11-18

## 2024-11-18 PROBLEM — R79.89 LOW VITAMIN D LEVEL: Status: RESOLVED | Noted: 2024-03-28 | Resolved: 2024-11-18

## 2024-11-18 PROBLEM — Z82.49 FAMILY HISTORY OF HIGH BLOOD PRESSURE: Status: RESOLVED | Noted: 2024-03-29 | Resolved: 2024-11-18

## 2024-11-18 PROCEDURE — 99024 POSTOP FOLLOW-UP VISIT: CPT | Performed by: ADVANCED PRACTICE MIDWIFE

## 2024-11-18 ASSESSMENT — ANXIETY QUESTIONNAIRES
6. BECOMING EASILY ANNOYED OR IRRITABLE: SEVERAL DAYS
GAD7 TOTAL SCORE: 5
7. FEELING AFRAID AS IF SOMETHING AWFUL MIGHT HAPPEN: SEVERAL DAYS
1. FEELING NERVOUS, ANXIOUS, OR ON EDGE: SEVERAL DAYS
4. TROUBLE RELAXING: NOT AT ALL
3. WORRYING TOO MUCH ABOUT DIFFERENT THINGS: SEVERAL DAYS
5. BEING SO RESTLESS THAT IT IS HARD TO SIT STILL: NOT AT ALL
2. NOT BEING ABLE TO STOP OR CONTROL WORRYING: SEVERAL DAYS

## 2024-11-18 NOTE — PROGRESS NOTES
HPI:  DELIVERY DATE: 11/2/24  TYPE OF DELIVERY: normal spontaneous vaginal delivery  PROVIDER: NANCY MORA  PERINEUM: intact     Morelia was seen for her 2 week visit.  Her Male infant is healthy.  She is breast feeding.    She is breastfeeding without difficulty.  She is not experiencing pain.    She reports her lochia is light, some clots in AM.  She reports no perineal discomfort.  She denies urinary incontinence.  Her bowels have returned to her normal pattern.  She is back to her normal activity pattern.  Morelia has not engaged in intercourse.   She does not report a mood disorder.  Hx anxiety, taking Zoloft 150 mg as prescribed. Recently saw primary care for management of medication. Reports has good support system. Sleep is adequate.   She feels she is having difficulty coping.  Morelia feels her family adjustment is effective.  She reports an overall positive birth experience.      OBJECTIVE:   Wt Readings from Last 3 Encounters:   11/18/24 69.5 kg (153 lb 3.2 oz)   11/14/24 70.3 kg (155 lb)   10/31/24 77.2 kg (170 lb 4.8 oz)       Blood pressure 116/70, pulse 89, height 1.6 m (5' 3\"), weight 69.5 kg (153 lb 3.2 oz), last menstrual period 01/10/2024, currently breastfeeding.    GERI-7: 5  EPDS: 8       Social Determinants of Health:   Financial Resource Strain: Low Risk  (3/27/2024)    Overall Financial Resource Strain (CARDIA)     Difficulty of Paying Living Expenses: Not hard at all      Food Insecurity: No Food Insecurity (11/1/2024)    Hunger Vital Sign     Worried About Running Out of Food in the Last Year: Never true     Ran Out of Food in the Last Year: Never true      Transportation Needs: No Transportation Needs (11/1/2024)    PRAPARE - Transportation     Lack of Transportation (Medical): No     Lack of Transportation (Non-Medical): No      Intimate Partner Violence: Not At Risk (3/27/2024)    Humiliation, Afraid, Rape, and Kick questionnaire     Fear of Current or Ex-Partner: No     Emotionally

## 2024-12-16 ENCOUNTER — HOSPITAL ENCOUNTER (OUTPATIENT)
Age: 34
Setting detail: SPECIMEN
Discharge: HOME | End: 2024-12-16

## 2024-12-16 ENCOUNTER — POSTPARTUM VISIT (OUTPATIENT)
Dept: OBGYN CLINIC | Age: 34
End: 2024-12-16
Payer: COMMERCIAL

## 2024-12-16 VITALS
BODY MASS INDEX: 27.54 KG/M2 | WEIGHT: 155.4 LBS | OXYGEN SATURATION: 96 % | HEIGHT: 63 IN | DIASTOLIC BLOOD PRESSURE: 70 MMHG | HEART RATE: 110 BPM | SYSTOLIC BLOOD PRESSURE: 100 MMHG

## 2024-12-16 DIAGNOSIS — N81.89 PELVIC FLOOR WEAKNESS: ICD-10-CM

## 2024-12-16 DIAGNOSIS — N39.3 STRESS INCONTINENCE: ICD-10-CM

## 2024-12-16 DIAGNOSIS — Z12.4 CERVICAL CANCER SCREENING: ICD-10-CM

## 2024-12-16 PROCEDURE — 99024 POSTOP FOLLOW-UP VISIT: CPT | Performed by: ADVANCED PRACTICE MIDWIFE

## 2024-12-16 ASSESSMENT — ANXIETY QUESTIONNAIRES
4. TROUBLE RELAXING: NOT AT ALL
GAD7 TOTAL SCORE: 5
5. BEING SO RESTLESS THAT IT IS HARD TO SIT STILL: NOT AT ALL
2. NOT BEING ABLE TO STOP OR CONTROL WORRYING: SEVERAL DAYS
1. FEELING NERVOUS, ANXIOUS, OR ON EDGE: SEVERAL DAYS
6. BECOMING EASILY ANNOYED OR IRRITABLE: SEVERAL DAYS
3. WORRYING TOO MUCH ABOUT DIFFERENT THINGS: SEVERAL DAYS
7. FEELING AFRAID AS IF SOMETHING AWFUL MIGHT HAPPEN: SEVERAL DAYS

## 2024-12-16 NOTE — PROGRESS NOTES
Chief Complaint   Patient presents with    Postpartum Care     pap     SUBJECTIVE:    HPI:  DELIVERY DATE: 11/2/24  TYPE OF DELIVERY: normal spontaneous vaginal delivery  PROVIDER: NANCY MORA  PERINEUM: intact    Morelia was seen for her 6 week postpartum visit.  Her Male infant is healthy.  She is breast feeding. She is breastfeeding without difficulty.  She is not experiencing pain.  She reports her lochia has stopped (about 2-3 weeks ago).   She reports no perineal discomfort.  She reports urinary incontinence. Mostly with cough/sneeze.   Her bowels have returned to her normal pattern.  She is overall back to her normal activity pattern.  She has not her first menses.    Morelia has not engaged in intercourse.   She does not report a mood disorder.  Feels anxiety is stable with Zoloft dose.   She feels she is not having difficulty coping.  Morelia feels her family adjustment is effective. She reports adequate support system.   She reports an overall positive birth experience.  Her [place] Score is 5.  This score does match my assessment.  She denies headache, vision changes, RUQ pain, epigastric pain, and swelling.     OBJECTIVE:   Wt Readings from Last 3 Encounters:   12/16/24 70.5 kg (155 lb 6.4 oz)   11/18/24 69.5 kg (153 lb 3.2 oz)   11/14/24 70.3 kg (155 lb)       Vitals:    12/16/24 1527   BP: 100/70   Pulse: (!) 110   SpO2: 96%   Weight: 70.5 kg (155 lb 6.4 oz)   Height: 1.6 m (5' 3\")        Breast exam: deferred    Abdomen: Soft non-tender without guarding. There is diastasis present.  The diastasis is 1 fingerbreadth of separation.    Perineum: intact, appears normal. Cervix is normal. No excessive discharge.     Extremities: No calf tenderness bilaterally. No edema.    EPDS:   In the past 7 days:  I have been able to laugh and see the funny side of things: As much as I always could  I have looked forward with enjoyment to things: As much as I ever did  I have blamed myself unnecessarily when things 
Several days   Feeling afraid as if something awful might happen Several days Several days Several days   GERI-7 Total Score 5 5 4   How difficult have these problems made it for you to do your work, take care of things at home, or get along with other people? Not difficult at all  Not difficult at all

## 2024-12-18 LAB
HPV I/H RISK 4 DNA CVX QL NAA+PROBE: NOT DETECTED
HPV SAMPLE: NORMAL
HPV, INTERPRETATION: NORMAL
HPV16 DNA CVX QL NAA+PROBE: NOT DETECTED
HPV18 DNA CVX QL NAA+PROBE: NOT DETECTED
SPECIMEN DESCRIPTION: NORMAL

## 2025-01-06 LAB — CYTOLOGY REPORT: NORMAL

## 2025-01-09 ENCOUNTER — HOSPITAL ENCOUNTER (OUTPATIENT)
Dept: PHYSICAL THERAPY | Facility: CLINIC | Age: 35
Setting detail: THERAPIES SERIES
Discharge: HOME | End: 2025-01-09
Payer: COMMERCIAL

## 2025-01-09 PROCEDURE — 97161 PT EVAL LOW COMPLEX 20 MIN: CPT

## 2025-01-09 PROCEDURE — 97110 THERAPEUTIC EXERCISES: CPT

## 2025-01-09 PROCEDURE — 97530 THERAPEUTIC ACTIVITIES: CPT

## 2025-01-09 NOTE — CONSULTS
OBSERVATION  No Deficit  Deficit  Not Tested  Comments    External            Posture  x         Pelvic alignment  x         Muscle Spasm  x         Scarring  x         Diastasis    x       Introitus    x   Min gaping    Perineal Descent    x   Min descent    Skin Condition  x         Excursion    x   Min excursion    External Clock  x         Internal **           Prolapse Test    x   Grade 2 cysto/utero   Internal clock  x         Vaginal Vault    x   roomy    Muscle bulk    x   decreased tone   Muscle Power    x   1-2/5 contraction    Muscle Endurance    x   2 sec    Quality of Contraction    x   fair   Specificity    x   Min overflow of glutes, adductors    Coordination    x        Sensation  x         **Pt consent provided for Internal exam:  [x] Yes   [] No  A chaperone was offered as part of the rooming process.  Patient declined and agrees to continue with exam without a chaperone.    Lumbar mobility screen: full, pain free        STRENGTH     Left Right   Hip Flex 5 5   Ext 5 5   ER 5 5   IR 5 5   ABD 5 5   ADD 5 5   *indicates pain production     FUNCTION  Normal  Difficult  Unable        Cough/Sneeze    x         Supine-Sit    x         Squatting    x         Bending/ stooping    x         Stairs    x         Hopping    x         Jumping    x         Running    x         Lifting/carrying    x           Functional Test: VIDAL-6 Score:    9/24 # points             37.5% functionally impaired     Assessment:Pt noted with rectus diastasis, weak PF musculature and inability to use correct musculature for strength improvement. Will provide ex prgm, biofeedback, ES and education as appropriate.   Problems:   [] ? Pain:  [] ? ROM:  [x] ? Strength:  [x] ? Function:  [x] Other:      STG: (to be met in 6 treatments)  Able to isolate PF musculature  Elicit PF muscle contraction long enough to inhibit bladder contraction with cough, sneeze. laugh  ? Strength:PF 3/5  ? Function: no reports of leaking with cough, sneeze

## 2025-01-16 ENCOUNTER — HOSPITAL ENCOUNTER (OUTPATIENT)
Dept: PHYSICAL THERAPY | Facility: CLINIC | Age: 35
Setting detail: THERAPIES SERIES
Discharge: HOME | End: 2025-01-16
Payer: COMMERCIAL

## 2025-01-16 PROCEDURE — G0283 ELEC STIM OTHER THAN WOUND: HCPCS

## 2025-01-16 PROCEDURE — 97112 NEUROMUSCULAR REEDUCATION: CPT

## 2025-01-16 PROCEDURE — 97530 THERAPEUTIC ACTIVITIES: CPT

## 2025-01-16 NOTE — FLOWSHEET NOTE
[] [facility]  Outpatient Rehabilitation &  Therapy  [address]  [phone]  [phone] [] [facility]  Outpatient Rehabilitation &  Therapy  [address]  P: [phone]  F: [phone] [x] [facility]  Outpatient Rehabilitation &  Therapy  [address]  P: [phone]  F: [phone] [] [facility]  Outpatient Rehabilitation &  Therapy  [address]  [phone]  [phone] [] [facility]  Outpatient Rehabilitation &  Therapy  [address]   P: [phone]  F: [phone]  [] [facility]  Outpatient Rehabilitation &  Therapy  [address]  P: [phone]  F: [phone] [] [facility]  Outpatient Rehabilitation &  Therapy  [address]  P: [phone]  F: [phone] [] [facility]  Outpatient Rehabilitation &  Therapy  [address]  P: [phone]  F: [phone] [] [facility]  Outpatient Rehabilitation &  Therapy  [address]  P: [phone]  F: [phone]  [] [facility] Outpatient Rehabilitation &  Therapy  [address]  P: [phone]  F: [phone]     Physical Therapy Daily Treatment Note    Date:  2025  Patient Name:  Morelia Flannery    :  1990  MRN: [id number]  Physician: Radha Donahue                              Insurance: UMR (25 vs, excluded codes: 81700, 67776 (biofeedback), 80237, )  Medical Diagnosis: Stress Incontinence N39.3, Pelvic floor weakness N81.89                   Rehab Codes: N39.3, M62.50  Onset Date: 24                                  Next 's appt: PRN     Visit# / total visits:     Cancels/No Shows: 0/0    Subjective:    Pain:  [] Yes  [x] No Location: N/A  Pain Rating: (0-10 scale) 0/10  Pain altered Tx:  [] No  [] Yes

## 2025-02-03 ENCOUNTER — OFFICE VISIT (OUTPATIENT)
Dept: PRIMARY CARE CLINIC | Age: 35
End: 2025-02-03

## 2025-02-03 VITALS
HEART RATE: 98 BPM | OXYGEN SATURATION: 99 % | DIASTOLIC BLOOD PRESSURE: 70 MMHG | BODY MASS INDEX: 25.99 KG/M2 | HEIGHT: 65 IN | WEIGHT: 156 LBS | SYSTOLIC BLOOD PRESSURE: 116 MMHG

## 2025-02-03 DIAGNOSIS — H61.23 BILATERAL IMPACTED CERUMEN: ICD-10-CM

## 2025-02-03 DIAGNOSIS — H65.93 FLUID LEVEL BEHIND TYMPANIC MEMBRANE OF BOTH EARS: ICD-10-CM

## 2025-02-03 DIAGNOSIS — Z13.1 SCREENING FOR DIABETES MELLITUS: Primary | ICD-10-CM

## 2025-02-03 DIAGNOSIS — H92.03 OTALGIA OF BOTH EARS: ICD-10-CM

## 2025-02-03 RX ORDER — FLUTICASONE PROPIONATE 50 MCG
2 SPRAY, SUSPENSION (ML) NASAL DAILY
Qty: 16 G | Refills: 0 | Status: SHIPPED | OUTPATIENT
Start: 2025-02-03

## 2025-02-03 ASSESSMENT — PATIENT HEALTH QUESTIONNAIRE - PHQ9
1. LITTLE INTEREST OR PLEASURE IN DOING THINGS: NOT AT ALL
SUM OF ALL RESPONSES TO PHQ QUESTIONS 1-9: 0
2. FEELING DOWN, DEPRESSED OR HOPELESS: NOT AT ALL
DEPRESSION UNABLE TO ASSESS: FUNCTIONAL CAPACITY MOTIVATION LIMITS ACCURACY
SUM OF ALL RESPONSES TO PHQ QUESTIONS 1-9: 0
SUM OF ALL RESPONSES TO PHQ QUESTIONS 1-9: 0
SUM OF ALL RESPONSES TO PHQ9 QUESTIONS 1 & 2: 0
SUM OF ALL RESPONSES TO PHQ QUESTIONS 1-9: 0

## 2025-02-03 ASSESSMENT — ENCOUNTER SYMPTOMS
ABDOMINAL DISTENTION: 0
SORE THROAT: 0
CONSTIPATION: 0
COUGH: 0
ABDOMINAL PAIN: 0
CHEST TIGHTNESS: 0
SHORTNESS OF BREATH: 0
DIARRHEA: 0

## 2025-02-03 NOTE — PROGRESS NOTES
[facility]  [facility] PRIMARY CARE  [address] [address]  Dept: [phone]    Morelia Flannery is a 34 y.o. female Established patient, who presents today for her medical conditions/complaints as noted below.      Chief Complaint   Patient presents with    Ear Pain     Patient states they the following concerns 1 + months        HPI:     History of Present Illness  The patient is a pleasant 34-year-old female who presents today with concerns of bilateral ear pain. She is due for blood work ordered in November, which has not been completed yet.    She has been experiencing persistent, dull, bilateral ear pain for over a month, which she first noticed around the holiday season. The intensity of the pain fluctuates, sometimes being mild and at other times quite severe. She also reports intermittent hearing loss and a sensation of fullness in her ears, leading her to suspect possible fluid accumulation. She has not sought any pharmacological intervention for these symptoms. She does not use any over-the-counter allergy medications. She has observed that her symptoms tend to worsen after consuming coffee in the morning. She reports no associated cough, congestion, fevers, chills, dizziness, or headaches.    Additionally, she reports occasional sore spots on her scalp, which are tender to touch, akin to the sensation of having bumped her head. She is curious if this could be related to inadequate hydration. She currently experiences generalized tenderness in her scalp.    She had mentioned about possibly having me coordinate her anxiety medicine, but her psychiatrist was able to run it through insurance, so she is just going to stick with that.      Reviewed prior notes None  Reviewed previous Labs    No results found for: \"LDL\", \"HDL\"    (goal LDL is <100)   TSH (uIU/mL)   Date Value   03/25/2024 0.34     BP Readings from Last 3 Encounters:   02/03/25 116/70   12/16/24

## 2025-03-26 ENCOUNTER — CLINICAL DOCUMENTATION (OUTPATIENT)
Dept: PHYSICAL THERAPY | Facility: CLINIC | Age: 35
End: 2025-03-26

## 2025-03-26 NOTE — THERAPY DISCHARGE
[x] [facility]  Outpatient Rehabilitation &  Therapy  [address]  P: [phone]  F: [phone] [] [facility]  Outpatient Rehabilitation &  Therapy  [address]  [phone]  [phone]     Physical Therapy Discharge Note    Date: 3/26/2025      Patient: Morelia Flannery  : 1990  MRN: [id number]    Physician: Radha Donahue                              Insurance: R (25 vs, excluded codes: 29242, 55628 (biofeedback), 12943, )  Medical Diagnosis: Stress Incontinence N39.3, Pelvic floor weakness N81.89                   Rehab Codes: N39.3, M62.50  Onset Date: 24                                  Next 's appt: PRN    Total visits attended:  Cancels/No shows:0  Date of initial visit: 25                Date of final visit: 25    Discharge Status:     Pt called asking to be discharged due to insurance. She stated she will resume at a future date.  Pt. is now discharged.        Electronically signed by: Rosalinda Sheehan PT    If you have any questions or concerns, please don't hesitate to call.  Thank you for your referral.

## 2025-06-18 ENCOUNTER — PATIENT MESSAGE (OUTPATIENT)
Dept: PRIMARY CARE CLINIC | Age: 35
End: 2025-06-18

## 2025-06-18 DIAGNOSIS — K90.41 GLUTEN INTOLERANCE: Primary | ICD-10-CM

## 2025-06-19 ENCOUNTER — HOSPITAL ENCOUNTER (OUTPATIENT)
Age: 35
Setting detail: SPECIMEN
Discharge: HOME | End: 2025-06-19

## 2025-06-19 DIAGNOSIS — F42.2 MIXED OBSESSIONAL THOUGHTS AND ACTS: ICD-10-CM

## 2025-06-19 DIAGNOSIS — F41.9 ANXIETY: ICD-10-CM

## 2025-06-19 DIAGNOSIS — Z13.1 SCREENING FOR DIABETES MELLITUS: ICD-10-CM

## 2025-06-19 DIAGNOSIS — H92.03 OTALGIA OF BOTH EARS: ICD-10-CM

## 2025-06-19 DIAGNOSIS — R79.89 LOW VITAMIN D LEVEL: ICD-10-CM

## 2025-06-19 DIAGNOSIS — K90.41 GLUTEN INTOLERANCE: ICD-10-CM

## 2025-06-19 LAB
25(OH)D3 SERPL-MCNC: 28.6 NG/ML (ref 30–100)
ALBUMIN SERPL-MCNC: 4.6 G/DL (ref 3.5–5.2)
ALBUMIN/GLOB SERPL: 1.6 {RATIO} (ref 1–2.5)
ALP SERPL-CCNC: 81 U/L (ref 35–104)
ALT SERPL-CCNC: 18 U/L (ref 10–35)
ANION GAP SERPL CALCULATED.3IONS-SCNC: 14 MMOL/L (ref 9–16)
AST SERPL-CCNC: 27 U/L (ref 10–35)
BILIRUB SERPL-MCNC: 0.6 MG/DL (ref 0–1.2)
BUN SERPL-MCNC: 11 MG/DL (ref 6–20)
CALCIUM SERPL-MCNC: 9.4 MG/DL (ref 8.6–10.4)
CHLORIDE SERPL-SCNC: 101 MMOL/L (ref 98–107)
CHOLEST SERPL-MCNC: 184 MG/DL (ref 0–199)
CHOLESTEROL/HDL RATIO: 2.6
CO2 SERPL-SCNC: 25 MMOL/L (ref 20–31)
CREAT SERPL-MCNC: 0.8 MG/DL (ref 0.6–0.9)
EST. AVERAGE GLUCOSE BLD GHB EST-MCNC: 105 MG/DL
GFR, ESTIMATED: >90 ML/MIN/1.73M2
GLUCOSE SERPL-MCNC: 75 MG/DL (ref 74–99)
HBA1C MFR BLD: 5.3 % (ref 4–6)
HDLC SERPL-MCNC: 72 MG/DL
LDLC SERPL CALC-MCNC: 103 MG/DL (ref 0–100)
POTASSIUM SERPL-SCNC: 4.2 MMOL/L (ref 3.7–5.3)
PROT SERPL-MCNC: 7.4 G/DL (ref 6.6–8.7)
SODIUM SERPL-SCNC: 140 MMOL/L (ref 136–145)
TRIGL SERPL-MCNC: 43 MG/DL (ref 0–149)
TSH SERPL DL<=0.05 MIU/L-ACNC: 0.68 UIU/ML (ref 0.27–4.2)
VLDLC SERPL CALC-MCNC: 9 MG/DL (ref 1–30)

## 2025-06-20 LAB
GLIADIN IGA SER IA-ACNC: 0.9 U/ML
GLIADIN IGG SER IA-ACNC: <0.4 U/ML
IGA SERPL-MCNC: 182 MG/DL (ref 70–400)
TTG IGA SER IA-ACNC: 0.4 U/ML

## 2025-06-23 ENCOUNTER — RESULTS FOLLOW-UP (OUTPATIENT)
Dept: PRIMARY CARE CLINIC | Age: 35
End: 2025-06-23